# Patient Record
Sex: MALE | Race: WHITE | NOT HISPANIC OR LATINO | Employment: OTHER | URBAN - METROPOLITAN AREA
[De-identification: names, ages, dates, MRNs, and addresses within clinical notes are randomized per-mention and may not be internally consistent; named-entity substitution may affect disease eponyms.]

---

## 2023-05-31 ENCOUNTER — OFFICE VISIT (OUTPATIENT)
Dept: PRIMARY CARE | Facility: CLINIC | Age: 78
End: 2023-05-31
Payer: MEDICARE

## 2023-05-31 DIAGNOSIS — Z71.9 HEALTH EDUCATION/COUNSELING: Primary | ICD-10-CM

## 2023-05-31 PROCEDURE — UHSMG PR UH SELECT MEET AND GREET: Performed by: FAMILY MEDICINE

## 2023-09-22 PROBLEM — I10 HYPERTENSION: Status: ACTIVE | Noted: 2023-09-22

## 2023-09-22 PROBLEM — E03.9 HYPOTHYROID: Status: ACTIVE | Noted: 2023-09-22

## 2023-09-22 PROBLEM — Z95.1 S/P CABG (CORONARY ARTERY BYPASS GRAFT): Status: ACTIVE | Noted: 2023-08-08

## 2023-09-22 PROBLEM — R35.0 URINE FREQUENCY: Status: ACTIVE | Noted: 2023-09-22

## 2023-09-22 PROBLEM — M25.569 KNEE PAIN: Status: ACTIVE | Noted: 2023-09-22

## 2023-09-22 PROBLEM — I25.10 CAD (CORONARY ARTERY DISEASE): Status: ACTIVE | Noted: 2023-09-22

## 2023-09-22 PROBLEM — E78.5 HYPERLIPIDEMIA: Status: ACTIVE | Noted: 2023-09-22

## 2023-09-22 PROBLEM — L71.9 ROSACEA: Status: ACTIVE | Noted: 2023-09-22

## 2023-09-22 PROBLEM — I25.2 PAST MYOCARDIAL INFARCTION: Status: ACTIVE | Noted: 2023-08-08

## 2023-09-22 PROBLEM — G47.33 OBSTRUCTIVE SLEEP APNEA: Status: ACTIVE | Noted: 2023-09-22

## 2023-09-22 PROBLEM — M85.80 OSTEOPENIA: Status: ACTIVE | Noted: 2023-09-22

## 2023-09-22 PROBLEM — E78.1 HIGH TRIGLYCERIDES: Status: ACTIVE | Noted: 2023-09-22

## 2023-09-22 PROBLEM — H35.30 MACULAR DEGENERATION: Status: ACTIVE | Noted: 2022-11-28

## 2023-09-22 RX ORDER — LEVOTHYROXINE SODIUM 75 UG/1
75 TABLET ORAL
COMMUNITY
Start: 2014-03-31 | End: 2024-02-06 | Stop reason: SDUPTHER

## 2023-09-22 RX ORDER — ATENOLOL 25 MG/1
12.5 TABLET ORAL DAILY
COMMUNITY
Start: 2014-03-31

## 2023-09-22 RX ORDER — DICLOFENAC SODIUM 75 MG/1
75 TABLET, DELAYED RELEASE ORAL 2 TIMES DAILY
COMMUNITY
Start: 2013-09-26 | End: 2024-02-06 | Stop reason: SDUPTHER

## 2023-09-22 RX ORDER — NITROGLYCERIN 0.4 MG/1
0.4 TABLET SUBLINGUAL EVERY 5 MIN PRN
COMMUNITY
Start: 2013-03-28

## 2023-09-22 RX ORDER — ACETAMINOPHEN 500 MG
1 TABLET ORAL DAILY
COMMUNITY
Start: 2020-01-31

## 2023-09-22 RX ORDER — DOCUSATE SODIUM 100 MG/1
1 CAPSULE, LIQUID FILLED ORAL DAILY
COMMUNITY
Start: 2014-03-31

## 2023-11-07 ENCOUNTER — TELEPHONE (OUTPATIENT)
Dept: ALLERGY | Facility: CLINIC | Age: 78
End: 2023-11-07
Payer: MEDICARE

## 2023-11-13 ENCOUNTER — OFFICE VISIT (OUTPATIENT)
Dept: ALLERGY | Facility: CLINIC | Age: 78
End: 2023-11-13
Payer: MEDICARE

## 2023-11-13 VITALS
BODY MASS INDEX: 30.78 KG/M2 | SYSTOLIC BLOOD PRESSURE: 120 MMHG | WEIGHT: 215 LBS | DIASTOLIC BLOOD PRESSURE: 76 MMHG | HEART RATE: 51 BPM | HEIGHT: 70 IN

## 2023-11-13 DIAGNOSIS — I25.118 CORONARY ARTERY DISEASE INVOLVING NATIVE HEART WITH OTHER FORM OF ANGINA PECTORIS, UNSPECIFIED VESSEL OR LESION TYPE (CMS-HCC): ICD-10-CM

## 2023-11-13 DIAGNOSIS — G47.33 OBSTRUCTIVE SLEEP APNEA: Primary | ICD-10-CM

## 2023-11-13 DIAGNOSIS — I10 PRIMARY HYPERTENSION: ICD-10-CM

## 2023-11-13 PROCEDURE — 99214 OFFICE O/P EST MOD 30 MIN: CPT | Performed by: ALLERGY & IMMUNOLOGY

## 2023-11-13 PROCEDURE — 3078F DIAST BP <80 MM HG: CPT | Performed by: ALLERGY & IMMUNOLOGY

## 2023-11-13 PROCEDURE — 1126F AMNT PAIN NOTED NONE PRSNT: CPT | Performed by: ALLERGY & IMMUNOLOGY

## 2023-11-13 PROCEDURE — 3074F SYST BP LT 130 MM HG: CPT | Performed by: ALLERGY & IMMUNOLOGY

## 2023-11-13 PROCEDURE — 1159F MED LIST DOCD IN RCRD: CPT | Performed by: ALLERGY & IMMUNOLOGY

## 2023-11-13 ASSESSMENT — PAIN SCALES - GENERAL: PAINLEVEL: 0-NO PAIN

## 2023-11-13 NOTE — PROGRESS NOTES
Subjective   Patient ID:   [id number]   Baron Harrell is a 78 y.o. male who presents for Sleep Apnea (1 year fuv).    Chief Complaint   Patient presents with    Sleep Apnea     1 year fuv          HPI  This patient is here to evaluate for:    He is doing well and no concerns.   We discussed surgical options and the benefits/risks and at this time     i interrogated the machine:  APAP 12-20cm  FLEX 3  90% pressure: 13.7  mask fit: 100%  AHI 1.7 (same as last year)  DME: Medic    He has had the replacement machine approx Jan '23    No excessive daytime sleepiness    This patient denies any sleepiness while driving or any accidents related to sleepiness. There is no report of nodding at stop lights/signs, crossing center lines, or motor vehicle accidents due to sleepiness.    SH: [facility]    His other daughter, who has learning differences, works at  in the Cafeteria/nutrition dept.  Sofiya his wife is dealing with anemia and fatigue.      Review of Systems   All other systems reviewed and are negative.    Reports new issue:  ?allergic reaction - sensation of heat after eating.  Then diarrhea  Strawberry allergy in childhood but outgrown    Had a 2 lb lobster and got the sensation of warmth. In June '23.  Lasted quickly.   Told his son, who called his wife, so insisted he go to [place] ED.  He was put through cardiac testing    But since then, he's had shrimp without problems.     It happened a number of times in years past but never before was it related to lobster. It was always unrelated to any foods.   Not related to nsaids  He takes daily diclofenac for 20 years without known side effects.   Etoh - he drinks and this could be related.   We decided that it if occurs again, we'll do skin testing.    Objective     There were no vitals taken for this visit.     Physical Exam  Vitals and nursing note reviewed.   Constitutional:       Appearance: Normal appearance. He is normal weight.    HENT:      Head: Normocephalic and atraumatic.      Right Ear: External ear normal.      Left Ear: External ear normal.      Nose: No septal deviation, congestion or rhinorrhea.      Right Turbinates: Not enlarged, swollen or pale.      Left Turbinates: Not enlarged, swollen or pale.      Mouth/Throat:      Mouth: Mucous membranes are moist.   Eyes:      Extraocular Movements: Extraocular movements intact.      Conjunctiva/sclera: Conjunctivae normal.      Pupils: Pupils are equal, round, and reactive to light.   Cardiovascular:      Rate and Rhythm: Normal rate and regular rhythm.      Pulses: Normal pulses.      Heart sounds: Normal heart sounds.   Pulmonary:      Effort: Pulmonary effort is normal.      Breath sounds: Normal breath sounds. No wheezing, rhonchi or rales.   Musculoskeletal:         General: Normal range of motion.   Skin:     General: Skin is warm and dry.      Findings: No erythema or rash.   Neurological:      General: No focal deficit present.      Mental Status: He is alert and oriented to person, place, and time.   Psychiatric:         Mood and Affect: Mood normal.         Behavior: Behavior normal.            Current Outpatient Medications   Medication Sig Dispense Refill    aspirin-calcium carbonate 81 mg-300 mg calcium(777 mg) tablet Take 2 tablets by mouth once daily.      atenolol (Tenormin) 25 mg tablet Take 0.5 tablets (12.5 mg) by mouth once daily.      cholecalciferol (Vitamin D-3) 5,000 Units tablet Take 1 tablet (5,000 Units) by mouth once daily.      diclofenac (Voltaren) 75 mg EC tablet Take 1 tablet (75 mg) by mouth 2 times a day.      docusate sodium (Colace) 100 mg capsule Take 1 capsule (100 mg) by mouth once daily.      nitroglycerin (Nitrostat) 0.4 mg SL tablet Place 1 tablet (0.4 mg) under the tongue every 5 minutes if needed.      Synthroid 75 mcg tablet Take 1 tablet (75 mcg) by mouth once daily in the morning. Take before meals.       No current facility-administered  medications for this visit.       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 This patient is benefiting from using PAP and it is medically necessary. Recommend continuing the current plan and machine settings.    Obstructive sleep apnea (MOHIT) is a risk factor for impairing one's ability to function on daily activities such as driving, focus and mental well-being.  And MOHIT is a risk factor and can lead to chronic health conditions and cardiovascular complications such as heart disease, high blood pressure, diabetes, heart attacks and stroke. CPAP has been shown to treat and prevent these conditions by treating the obstructive sleep apnea. This patient has HTN and CAD  and we are addressing its treatment with CPAP. The MOHIT is WELL CONTROLLED and management with CPAP is reducing these risks.    See review of systems.      Emanuel Sullivan MD

## 2023-12-01 DIAGNOSIS — Z00.00 HEALTHCARE MAINTENANCE: ICD-10-CM

## 2024-01-08 ENCOUNTER — LAB (OUTPATIENT)
Dept: LAB | Facility: LAB | Age: 79
End: 2024-01-08
Payer: MEDICARE

## 2024-01-08 DIAGNOSIS — Z00.00 HEALTHCARE MAINTENANCE: ICD-10-CM

## 2024-01-08 DIAGNOSIS — I10 ESSENTIAL (PRIMARY) HYPERTENSION: Primary | ICD-10-CM

## 2024-01-08 LAB
ALBUMIN SERPL BCP-MCNC: 4.4 G/DL (ref 3.4–5)
ALP SERPL-CCNC: 71 U/L (ref 33–136)
ALT SERPL W P-5'-P-CCNC: 38 U/L (ref 10–52)
ANION GAP SERPL CALC-SCNC: 14 MMOL/L (ref 10–20)
AST SERPL W P-5'-P-CCNC: 31 U/L (ref 9–39)
BASOPHILS # BLD AUTO: 0.02 X10*3/UL (ref 0–0.1)
BASOPHILS NFR BLD AUTO: 0.3 %
BILIRUB SERPL-MCNC: 0.7 MG/DL (ref 0–1.2)
BUN SERPL-MCNC: 25 MG/DL (ref 6–23)
CALCIUM SERPL-MCNC: 9.1 MG/DL (ref 8.6–10.6)
CHLORIDE SERPL-SCNC: 106 MMOL/L (ref 98–107)
CHOLEST SERPL-MCNC: 102 MG/DL (ref 0–199)
CHOLESTEROL/HDL RATIO: 3.8
CO2 SERPL-SCNC: 25 MMOL/L (ref 21–32)
CREAT SERPL-MCNC: 0.92 MG/DL (ref 0.5–1.3)
CRP SERPL HS-MCNC: 0.8 MG/L
EGFRCR SERPLBLD CKD-EPI 2021: 86 ML/MIN/1.73M*2
EOSINOPHIL # BLD AUTO: 0.29 X10*3/UL (ref 0–0.4)
EOSINOPHIL NFR BLD AUTO: 5.1 %
ERYTHROCYTE [DISTWIDTH] IN BLOOD BY AUTOMATED COUNT: 13.5 % (ref 11.5–14.5)
EST. AVERAGE GLUCOSE BLD GHB EST-MCNC: 114 MG/DL
GLUCOSE SERPL-MCNC: 107 MG/DL (ref 74–99)
HBA1C MFR BLD: 5.6 %
HCT VFR BLD AUTO: 40.3 % (ref 41–52)
HDLC SERPL-MCNC: 26.8 MG/DL
HGB BLD-MCNC: 13.1 G/DL (ref 13.5–17.5)
IMM GRANULOCYTES # BLD AUTO: 0.01 X10*3/UL (ref 0–0.5)
IMM GRANULOCYTES NFR BLD AUTO: 0.2 % (ref 0–0.9)
LDLC SERPL CALC-MCNC: 20 MG/DL
LYMPHOCYTES # BLD AUTO: 1.14 X10*3/UL (ref 0.8–3)
LYMPHOCYTES NFR BLD AUTO: 19.9 %
MCH RBC QN AUTO: 30.3 PG (ref 26–34)
MCHC RBC AUTO-ENTMCNC: 32.5 G/DL (ref 32–36)
MCV RBC AUTO: 93 FL (ref 80–100)
MONOCYTES # BLD AUTO: 0.54 X10*3/UL (ref 0.05–0.8)
MONOCYTES NFR BLD AUTO: 9.4 %
NEUTROPHILS # BLD AUTO: 3.74 X10*3/UL (ref 1.6–5.5)
NEUTROPHILS NFR BLD AUTO: 65.1 %
NON HDL CHOLESTEROL: 75 MG/DL (ref 0–149)
NRBC BLD-RTO: 0 /100 WBCS (ref 0–0)
PLATELET # BLD AUTO: 135 X10*3/UL (ref 150–450)
POTASSIUM SERPL-SCNC: 4.2 MMOL/L (ref 3.5–5.3)
PROT SERPL-MCNC: 6.9 G/DL (ref 6.4–8.2)
PSA SERPL-MCNC: 2.11 NG/ML
RBC # BLD AUTO: 4.32 X10*6/UL (ref 4.5–5.9)
SODIUM SERPL-SCNC: 141 MMOL/L (ref 136–145)
TRIGL SERPL-MCNC: 276 MG/DL (ref 0–149)
VLDL: 55 MG/DL (ref 0–40)
WBC # BLD AUTO: 5.7 X10*3/UL (ref 4.4–11.3)

## 2024-01-08 PROCEDURE — 36415 COLL VENOUS BLD VENIPUNCTURE: CPT

## 2024-01-08 PROCEDURE — 82652 VIT D 1 25-DIHYDROXY: CPT

## 2024-01-08 PROCEDURE — 80061 LIPID PANEL: CPT

## 2024-01-08 PROCEDURE — 84153 ASSAY OF PSA TOTAL: CPT

## 2024-01-08 PROCEDURE — 86141 C-REACTIVE PROTEIN HS: CPT

## 2024-01-08 PROCEDURE — 85025 COMPLETE CBC W/AUTO DIFF WBC: CPT

## 2024-01-08 PROCEDURE — 80053 COMPREHEN METABOLIC PANEL: CPT

## 2024-01-08 PROCEDURE — 83036 HEMOGLOBIN GLYCOSYLATED A1C: CPT

## 2024-01-09 LAB — 1,25(OH)2D3 SERPL-MCNC: 49 PG/ML (ref 19.9–79.3)

## 2024-01-14 ENCOUNTER — TELEPHONE (OUTPATIENT)
Dept: PRIMARY CARE | Facility: CLINIC | Age: 79
End: 2024-01-14
Payer: MEDICARE

## 2024-02-02 ENCOUNTER — LAB (OUTPATIENT)
Dept: LAB | Facility: LAB | Age: 79
End: 2024-02-02
Payer: MEDICARE

## 2024-02-02 DIAGNOSIS — Z00.00 HEALTHCARE MAINTENANCE: ICD-10-CM

## 2024-02-02 LAB
APPEARANCE UR: ABNORMAL
BILIRUB UR STRIP.AUTO-MCNC: NEGATIVE MG/DL
COLOR UR: YELLOW
GLUCOSE UR STRIP.AUTO-MCNC: NEGATIVE MG/DL
KETONES UR STRIP.AUTO-MCNC: NEGATIVE MG/DL
LEUKOCYTE ESTERASE UR QL STRIP.AUTO: NEGATIVE
NITRITE UR QL STRIP.AUTO: NEGATIVE
PH UR STRIP.AUTO: 5 [PH]
PROT UR STRIP.AUTO-MCNC: NEGATIVE MG/DL
RBC # UR STRIP.AUTO: NEGATIVE /UL
SP GR UR STRIP.AUTO: 1.02
UROBILINOGEN UR STRIP.AUTO-MCNC: <2 MG/DL

## 2024-02-02 PROCEDURE — 81003 URINALYSIS AUTO W/O SCOPE: CPT

## 2024-02-06 ENCOUNTER — LAB (OUTPATIENT)
Dept: LAB | Facility: LAB | Age: 79
End: 2024-02-06
Payer: MEDICARE

## 2024-02-06 ENCOUNTER — OFFICE VISIT (OUTPATIENT)
Dept: PRIMARY CARE | Facility: CLINIC | Age: 79
End: 2024-02-06
Payer: MEDICARE

## 2024-02-06 VITALS
HEART RATE: 53 BPM | BODY MASS INDEX: 31.64 KG/M2 | DIASTOLIC BLOOD PRESSURE: 82 MMHG | TEMPERATURE: 98.3 F | HEIGHT: 69 IN | OXYGEN SATURATION: 98 % | SYSTOLIC BLOOD PRESSURE: 130 MMHG | WEIGHT: 213.6 LBS

## 2024-02-06 DIAGNOSIS — E03.9 ADULT HYPOTHYROIDISM: ICD-10-CM

## 2024-02-06 DIAGNOSIS — Z00.01 ENCOUNTER FOR GENERAL ADULT MEDICAL EXAMINATION WITH ABNORMAL FINDINGS: Primary | ICD-10-CM

## 2024-02-06 DIAGNOSIS — D64.9 ANEMIA, UNSPECIFIED TYPE: ICD-10-CM

## 2024-02-06 DIAGNOSIS — G62.9 NEUROPATHY: ICD-10-CM

## 2024-02-06 DIAGNOSIS — E78.5 DYSLIPIDEMIA: ICD-10-CM

## 2024-02-06 DIAGNOSIS — I25.118 CORONARY ARTERY DISEASE INVOLVING NATIVE HEART WITH OTHER FORM OF ANGINA PECTORIS, UNSPECIFIED VESSEL OR LESION TYPE (CMS-HCC): ICD-10-CM

## 2024-02-06 DIAGNOSIS — I10 HYPERTENSION, UNSPECIFIED TYPE: ICD-10-CM

## 2024-02-06 DIAGNOSIS — G89.29 CHRONIC BACK PAIN, UNSPECIFIED BACK LOCATION, UNSPECIFIED BACK PAIN LATERALITY: ICD-10-CM

## 2024-02-06 DIAGNOSIS — M54.9 CHRONIC BACK PAIN, UNSPECIFIED BACK LOCATION, UNSPECIFIED BACK PAIN LATERALITY: ICD-10-CM

## 2024-02-06 LAB
IRON SATN MFR SERPL: 20 % (ref 25–45)
IRON SERPL-MCNC: 83 UG/DL (ref 35–150)
TIBC SERPL-MCNC: 412 UG/DL (ref 240–445)
UIBC SERPL-MCNC: 329 UG/DL (ref 110–370)

## 2024-02-06 PROCEDURE — 3075F SYST BP GE 130 - 139MM HG: CPT | Performed by: FAMILY MEDICINE

## 2024-02-06 PROCEDURE — 3079F DIAST BP 80-89 MM HG: CPT | Performed by: FAMILY MEDICINE

## 2024-02-06 PROCEDURE — 36415 COLL VENOUS BLD VENIPUNCTURE: CPT

## 2024-02-06 PROCEDURE — 83550 IRON BINDING TEST: CPT

## 2024-02-06 PROCEDURE — 1159F MED LIST DOCD IN RCRD: CPT | Performed by: FAMILY MEDICINE

## 2024-02-06 PROCEDURE — UHSPHYS PR UH SELECT PHYSICAL: Performed by: FAMILY MEDICINE

## 2024-02-06 PROCEDURE — 84207 ASSAY OF VITAMIN B-6: CPT

## 2024-02-06 PROCEDURE — 1036F TOBACCO NON-USER: CPT | Performed by: FAMILY MEDICINE

## 2024-02-06 PROCEDURE — 83540 ASSAY OF IRON: CPT

## 2024-02-06 PROCEDURE — 1126F AMNT PAIN NOTED NONE PRSNT: CPT | Performed by: FAMILY MEDICINE

## 2024-02-06 RX ORDER — ROSUVASTATIN CALCIUM 40 MG/1
40 TABLET, COATED ORAL DAILY
COMMUNITY
Start: 2020-01-31 | End: 2024-02-06 | Stop reason: SDUPTHER

## 2024-02-06 RX ORDER — LEVOTHYROXINE SODIUM 200 UG/1
200 TABLET ORAL DAILY
COMMUNITY
Start: 2023-10-27 | End: 2024-02-06 | Stop reason: SDUPTHER

## 2024-02-06 RX ORDER — RAMIPRIL 5 MG/1
5 CAPSULE ORAL DAILY
Qty: 90 CAPSULE | Refills: 3 | Status: SHIPPED | OUTPATIENT
Start: 2024-02-06

## 2024-02-06 RX ORDER — LEVOTHYROXINE SODIUM 200 UG/1
200 TABLET ORAL
Qty: 90 TABLET | Refills: 3 | Status: SHIPPED | OUTPATIENT
Start: 2024-02-06

## 2024-02-06 RX ORDER — METRONIDAZOLE 7.5 MG/G
CREAM TOPICAL DAILY
COMMUNITY
Start: 2023-12-19

## 2024-02-06 RX ORDER — DICLOFENAC SODIUM 75 MG/1
75 TABLET, DELAYED RELEASE ORAL 2 TIMES DAILY
Qty: 180 TABLET | Refills: 3 | Status: SHIPPED | OUTPATIENT
Start: 2024-02-06

## 2024-02-06 RX ORDER — CALCIUM CARBONATE 300MG(750)
400 TABLET,CHEWABLE ORAL DAILY
COMMUNITY

## 2024-02-06 RX ORDER — RAMIPRIL 5 MG/1
CAPSULE ORAL
COMMUNITY
Start: 2014-03-31 | End: 2024-02-06 | Stop reason: SDUPTHER

## 2024-02-06 RX ORDER — ROSUVASTATIN CALCIUM 40 MG/1
40 TABLET, COATED ORAL DAILY
Qty: 90 TABLET | Refills: 3 | Status: SHIPPED | OUTPATIENT
Start: 2024-02-06

## 2024-02-06 RX ORDER — LEVOTHYROXINE SODIUM 75 UG/1
75 TABLET ORAL
Qty: 90 TABLET | Refills: 3 | Status: SHIPPED | OUTPATIENT
Start: 2024-02-06

## 2024-02-06 RX ORDER — SODIUM FLUORIDE1.1%, POTASSIUM NITRATE 5% 5.8; 57.5 MG/ML; MG/ML
GEL, DENTIFRICE DENTAL
COMMUNITY
Start: 2013-09-05

## 2024-02-06 RX ORDER — FLUOCINOLONE ACETONIDE 0.1 MG/ML
SOLUTION TOPICAL
COMMUNITY
Start: 2023-12-19 | End: 2024-05-21 | Stop reason: WASHOUT

## 2024-02-06 ASSESSMENT — LIFESTYLE VARIABLES
SKIP TO QUESTIONS 9-10: 1
HOW OFTEN DO YOU HAVE A DRINK CONTAINING ALCOHOL: 2-4 TIMES A MONTH
HOW OFTEN DO YOU HAVE SIX OR MORE DRINKS ON ONE OCCASION: NEVER
HOW MANY STANDARD DRINKS CONTAINING ALCOHOL DO YOU HAVE ON A TYPICAL DAY: 1 OR 2
AUDIT-C TOTAL SCORE: 2

## 2024-02-06 ASSESSMENT — PATIENT HEALTH QUESTIONNAIRE - PHQ9
SUM OF ALL RESPONSES TO PHQ9 QUESTIONS 1 & 2: 0
2. FEELING DOWN, DEPRESSED OR HOPELESS: NOT AT ALL
1. LITTLE INTEREST OR PLEASURE IN DOING THINGS: NOT AT ALL

## 2024-02-06 ASSESSMENT — ENCOUNTER SYMPTOMS
LOSS OF SENSATION IN FEET: 1
DEPRESSION: 0
OCCASIONAL FEELINGS OF UNSTEADINESS: 0

## 2024-02-06 ASSESSMENT — PAIN SCALES - GENERAL: PAINLEVEL: 0-NO PAIN

## 2024-02-06 NOTE — PROGRESS NOTES
"Subjective   Patient ID: Baron Harrell is a 78 y.o. male who presents for Annual Exam ([facility]).  HPI  1) Here to follow-up hypertension/lipids/CAD -denies chest pain, shortness of breath, dizziness, lightheadedness, hand or foot swelling that is new or different.  Taking and tolerating medications well.  Doing well with physical activity -does work with a  3 times a week on balance and variety of exercises    2) history of hypothyroidism, recent testing within target  No hyper or hypothyroid symptoms noted    3) history of scoliosis and some chronic back pain  Has been taking diclofenac twice daily for a long time and tolerating well  No dark stools or blood in stools  No epigastric discomfort  Finds this allows him to have a more active day  Recent kidney function tests quite good    4) history of some neuropathy in his feet, uncertain etiology  He suspects it may be related to his back  However he does take some supplements, question of this may be too much B6 in his diet    5) he used to relish donating blood, however became somewhat anemic, did not have significant workup but was told by PCP in the past to just stop donating   No significant bruising or bleeding  Red blood cell indices do not suggest iron deficiency per se, but given low hemoglobin we will check iron levels    Review of Systems  Essentially noncontributory otherwise    Objective   /82 (BP Location: Left arm, Patient Position: Sitting, BP Cuff Size: Large adult)   Pulse 53   Temp 36.8 °C (98.3 °F) (Temporal)   Ht 1.765 m (5' 9.49\")   Wt 96.9 kg (213 lb 9.6 oz)   SpO2 98%   BMI 31.10 kg/m²     Physical Exam  Vitals and nursing note reviewed.   Constitutional:       General: He is not in acute distress.     Appearance: He is not ill-appearing.   HENT:      Head: Normocephalic and atraumatic.      Right Ear: Tympanic membrane normal.      Left Ear: Tympanic membrane normal.      Mouth/Throat:      Pharynx: No posterior " oropharyngeal erythema.   Eyes:      General: No scleral icterus.     Extraocular Movements: Extraocular movements intact.      Pupils: Pupils are equal, round, and reactive to light.   Cardiovascular:      Rate and Rhythm: Regular rhythm. Bradycardia present.      Heart sounds: No murmur heard.  Pulmonary:      Breath sounds: Normal breath sounds. No wheezing or rhonchi.   Abdominal:      General: There is no distension.      Palpations: Abdomen is soft.      Tenderness: There is no abdominal tenderness.   Musculoskeletal:         General: Normal range of motion.      Right lower leg: No edema.      Left lower leg: No edema.   Lymphadenopathy:      Cervical: No cervical adenopathy.   Skin:     General: Skin is warm and dry.   Neurological:      Mental Status: He is alert and oriented to person, place, and time. Mental status is at baseline.      Motor: No weakness.      Coordination: Coordination normal.      Deep Tendon Reflexes: Reflexes normal.   Psychiatric:         Mood and Affect: Mood normal.         Behavior: Behavior normal.         Thought Content: Thought content normal.         Judgment: Judgment normal.         Assessment/Plan   Problem List Items Addressed This Visit       CAD (coronary artery disease)    Relevant Medications    rosuvastatin (Crestor) 40 mg tablet    Hypertension    Relevant Medications    ramipril (Altace) 5 mg capsule     Other Visit Diagnoses       Encounter for general adult medical examination with abnormal findings    -  Primary    Neuropathy        Relevant Orders    Vitamin B6    Dyslipidemia        Adult hypothyroidism        Relevant Medications    levothyroxine (Synthroid) 200 mcg tablet    levothyroxine (Synthroid) 75 mcg tablet    Anemia, unspecified type        Relevant Orders    Iron and TIBC    Chronic back pain, unspecified back location, unspecified back pain laterality        Relevant Medications    diclofenac (Voltaren) 75 mg EC tablet        1) Cardiac risk -seems  somewhat optimized now - cpm    2) thyroid - cpm - await TSH    3/4) back issues - may be driving neuropathy - cont NSAID and check B6    5) anemia - ck iron     6) prevention   - enc Prevnar-20  - colon CA - screen consider 2029  - prostate CA - PSA good  - skin CA - does see derm ([place])

## 2024-02-10 LAB — PYRIDOXAL PHOS SERPL-SCNC: 337.7 NMOL/L (ref 20–125)

## 2024-02-18 ENCOUNTER — TELEPHONE (OUTPATIENT)
Dept: PRIMARY CARE | Facility: CLINIC | Age: 79
End: 2024-02-18
Payer: MEDICARE

## 2024-02-18 DIAGNOSIS — E61.1 IRON DEFICIENCY: ICD-10-CM

## 2024-02-18 DIAGNOSIS — G62.9 NEUROPATHY: Primary | ICD-10-CM

## 2024-02-18 DIAGNOSIS — D64.9 ANEMIA, UNSPECIFIED TYPE: ICD-10-CM

## 2024-02-18 NOTE — TELEPHONE ENCOUNTER
----- Message from Baron Harrell sent at 2/13/2024  5:11 PM EST -----  Regarding: my lab results from 2/6  Contact: [phone]  Tod: What is your take on my B6 and iron saturation. Thanks, Baron

## 2024-02-18 NOTE — TELEPHONE ENCOUNTER
Had called and d/w patient   Recommend avoid any supplements with B6 in them - also can send a list of foods with B6 in them    Recommend start an iron supplement and check levels in 4-6 weeks    Earl Draper     I just wanted to follow-up about our call the other day.    I hope you have been able to start the Iron Sulfate (FeSO4) 325 mg once daily.  We should check the iron tests and B6 level in 4-6 weeks - no fasting needed.  Here's a list of foods that are high in B6.  You don't have to avoid them, just limit them.      Here are some good food sources for B6 for your mom to focus on consuming…  1)      Milk  2)      Avocado  3)      Chickpeas  4)      Bananas  5)      Green Peas  6)      Carrots  7)      Spinach  8)      Sweet Potato  9)      Beef  10)   Chicken Liver  11)   Eggs  12)   [place]  13)   Tuna  14)   Ricotta    Best regards - Boone

## 2024-05-08 DIAGNOSIS — L60.0 INGROWING NAIL: Primary | ICD-10-CM

## 2024-05-08 DIAGNOSIS — I73.9 PERIPHERAL VASCULAR DISEASE (CMS-HCC): ICD-10-CM

## 2024-05-20 ENCOUNTER — HOSPITAL ENCOUNTER (OUTPATIENT)
Dept: VASCULAR MEDICINE | Facility: CLINIC | Age: 79
Discharge: HOME | End: 2024-05-20
Payer: MEDICARE

## 2024-05-20 DIAGNOSIS — L60.0 INGROWING NAIL: ICD-10-CM

## 2024-05-20 DIAGNOSIS — I73.9 PERIPHERAL VASCULAR DISEASE (CMS-HCC): ICD-10-CM

## 2024-05-20 PROCEDURE — 93922 UPR/L XTREMITY ART 2 LEVELS: CPT | Performed by: STUDENT IN AN ORGANIZED HEALTH CARE EDUCATION/TRAINING PROGRAM

## 2024-05-20 PROCEDURE — 93922 UPR/L XTREMITY ART 2 LEVELS: CPT

## 2024-05-21 ENCOUNTER — OFFICE VISIT (OUTPATIENT)
Dept: CARDIOLOGY | Facility: CLINIC | Age: 79
End: 2024-05-21
Payer: MEDICARE

## 2024-05-21 VITALS
HEIGHT: 70 IN | HEART RATE: 49 BPM | OXYGEN SATURATION: 96 % | BODY MASS INDEX: 30.95 KG/M2 | DIASTOLIC BLOOD PRESSURE: 77 MMHG | SYSTOLIC BLOOD PRESSURE: 119 MMHG | WEIGHT: 216.2 LBS

## 2024-05-21 DIAGNOSIS — L60.0 INGROWN NAIL: Primary | ICD-10-CM

## 2024-05-21 PROCEDURE — 1036F TOBACCO NON-USER: CPT | Performed by: HOSPITALIST

## 2024-05-21 PROCEDURE — 99203 OFFICE O/P NEW LOW 30 MIN: CPT | Performed by: HOSPITALIST

## 2024-05-21 PROCEDURE — 1126F AMNT PAIN NOTED NONE PRSNT: CPT | Performed by: HOSPITALIST

## 2024-05-21 PROCEDURE — 1159F MED LIST DOCD IN RCRD: CPT | Performed by: HOSPITALIST

## 2024-05-21 PROCEDURE — 3078F DIAST BP <80 MM HG: CPT | Performed by: HOSPITALIST

## 2024-05-21 PROCEDURE — 3074F SYST BP LT 130 MM HG: CPT | Performed by: HOSPITALIST

## 2024-05-21 PROCEDURE — 99213 OFFICE O/P EST LOW 20 MIN: CPT | Performed by: HOSPITALIST

## 2024-05-21 PROCEDURE — 1160F RVW MEDS BY RX/DR IN RCRD: CPT | Performed by: HOSPITALIST

## 2024-05-21 RX ORDER — ASPIRIN 81 MG/1
81 TABLET ORAL DAILY
COMMUNITY

## 2024-05-21 ASSESSMENT — PATIENT HEALTH QUESTIONNAIRE - PHQ9
2. FEELING DOWN, DEPRESSED OR HOPELESS: NOT AT ALL
1. LITTLE INTEREST OR PLEASURE IN DOING THINGS: NOT AT ALL
SUM OF ALL RESPONSES TO PHQ9 QUESTIONS 1 AND 2: 0

## 2024-05-21 ASSESSMENT — COLUMBIA-SUICIDE SEVERITY RATING SCALE - C-SSRS
2. HAVE YOU ACTUALLY HAD ANY THOUGHTS OF KILLING YOURSELF?: NO
6. HAVE YOU EVER DONE ANYTHING, STARTED TO DO ANYTHING, OR PREPARED TO DO ANYTHING TO END YOUR LIFE?: NO
1. IN THE PAST MONTH, HAVE YOU WISHED YOU WERE DEAD OR WISHED YOU COULD GO TO SLEEP AND NOT WAKE UP?: NO

## 2024-05-21 ASSESSMENT — PAIN SCALES - GENERAL: PAINLEVEL: 0-NO PAIN

## 2024-05-21 NOTE — PROGRESS NOTES
Report given to Akiko ARRIAGA   Subjective   Baron Harrell is a 79 y.o. male With PMH of CAD status post remote CABG, HLD, HTN, MOHIT, and hypothyroidism, was referred by Dr. Castle for evaluation of PAD.  Patient has ingrown nail in his left leg, Dr. Huerta is planning for procedure.  Patient had PVR testing yesterday which showed triphasic flow throughout with normal ABIs.  Blood pressure today is normal at 127 over 69 mmHg. Patient is on aspirin 81, atenolol 25, ramipril 5 mg, atorvastatin 40 mg.    PVR from 5/20/2024 with right WOLF of 1.25/1.29 and left WOLF of 1.28/1.19; triphasic flow throughout.      Review of Systems  ROS is negative other than in HPI.      Objective   Physical Exam  General: NAD  HEENT: IEOM, PERRL   Neck: No JVD or carotid bruit  Lungs: CTAB  Heart: RRR, normal S1 and S2, no loud murmurs  Abdomen: Soft, nontender, positive bowel sounds  Extremities: No edema  Neurologic: No FND  Psychiatric: Normal mood and affect    Assessment/Plan   1-ingrown nail:  -PVR with no evidence of PAD.  -Podiatry can proceed with procedure of the ingrown nail.  If any problem with postop wound healing, please refer back.    2-history of CABG:  -Asymptomatic with good level of function capacity.  -Continue aspirin beta-blocker and atorvastatin 40.  -Follow-up with cardiologist.      Edwin Bolton MD

## 2024-05-24 ENCOUNTER — TELEPHONE (OUTPATIENT)
Dept: PRIMARY CARE | Facility: CLINIC | Age: 79
End: 2024-05-24
Payer: MEDICARE

## 2024-05-24 NOTE — TELEPHONE ENCOUNTER
"Patient calling to report that he just tested positive for COVID.   He came down a \"mild cold\" about 2 days ago.   He is supposed to be going out of town and his wife suggested he test before leaving.   He questions if he may be a candidate for the antiviral to help take the edge off.  Please call to further discuss.   "

## 2024-08-23 PROBLEM — H50.21 HYPOTROPIA OF RIGHT EYE: Status: ACTIVE | Noted: 2023-12-04

## 2024-08-23 PROBLEM — H35.3130 BILATERAL DRY AGE-RELATED MACULAR DEGENERATION: Status: ACTIVE | Noted: 2023-12-04

## 2024-08-23 PROBLEM — Z96.1 PSEUDOPHAKIA OF BOTH EYES: Status: ACTIVE | Noted: 2023-12-04

## 2024-08-23 PROBLEM — H50.22 HYPOTROPIA OF LEFT EYE: Status: ACTIVE | Noted: 2023-12-04

## 2024-08-26 ENCOUNTER — OFFICE VISIT (OUTPATIENT)
Dept: CARDIOLOGY | Facility: CLINIC | Age: 79
End: 2024-08-26
Payer: MEDICARE

## 2024-08-26 VITALS
BODY MASS INDEX: 30.72 KG/M2 | SYSTOLIC BLOOD PRESSURE: 113 MMHG | OXYGEN SATURATION: 95 % | HEIGHT: 70 IN | HEART RATE: 52 BPM | WEIGHT: 214.56 LBS | DIASTOLIC BLOOD PRESSURE: 71 MMHG

## 2024-08-26 DIAGNOSIS — I25.10 CORONARY ARTERY DISEASE INVOLVING NATIVE HEART WITHOUT ANGINA PECTORIS, UNSPECIFIED VESSEL OR LESION TYPE: Primary | ICD-10-CM

## 2024-08-26 PROCEDURE — 99213 OFFICE O/P EST LOW 20 MIN: CPT | Performed by: INTERNAL MEDICINE

## 2024-08-26 PROCEDURE — 1125F AMNT PAIN NOTED PAIN PRSNT: CPT | Performed by: INTERNAL MEDICINE

## 2024-08-26 PROCEDURE — 3074F SYST BP LT 130 MM HG: CPT | Performed by: INTERNAL MEDICINE

## 2024-08-26 PROCEDURE — 1036F TOBACCO NON-USER: CPT | Performed by: INTERNAL MEDICINE

## 2024-08-26 PROCEDURE — 1159F MED LIST DOCD IN RCRD: CPT | Performed by: INTERNAL MEDICINE

## 2024-08-26 PROCEDURE — 3078F DIAST BP <80 MM HG: CPT | Performed by: INTERNAL MEDICINE

## 2024-08-26 RX ORDER — UBIDECARENONE 30 MG
30 CAPSULE ORAL DAILY
COMMUNITY

## 2024-08-26 RX ORDER — NITROGLYCERIN 0.4 MG/1
0.4 TABLET SUBLINGUAL EVERY 5 MIN PRN
Qty: 25 TABLET | Refills: 1 | Status: SHIPPED | OUTPATIENT
Start: 2024-08-26 | End: 2024-09-25

## 2024-08-26 RX ORDER — FERROUS SULFATE 325(65) MG
325 TABLET ORAL
COMMUNITY

## 2024-08-26 ASSESSMENT — PAIN SCALES - GENERAL: PAINLEVEL: 4

## 2024-08-26 ASSESSMENT — ENCOUNTER SYMPTOMS
HEMOPTYSIS: 0
FALLS: 0
MEMORY LOSS: 0
COUGH: 0
OCCASIONAL FEELINGS OF UNSTEADINESS: 0
DIARRHEA: 0
NAUSEA: 0
CHILLS: 0
BLOATING: 0
WHEEZING: 0
HEMATURIA: 0
VOMITING: 0
FEVER: 0
HEADACHES: 0
DYSURIA: 0
LOSS OF SENSATION IN FEET: 0
MYALGIAS: 0
DEPRESSION: 0
ABDOMINAL PAIN: 0
ALTERED MENTAL STATUS: 0
CONSTIPATION: 0

## 2024-08-26 ASSESSMENT — COLUMBIA-SUICIDE SEVERITY RATING SCALE - C-SSRS
6. HAVE YOU EVER DONE ANYTHING, STARTED TO DO ANYTHING, OR PREPARED TO DO ANYTHING TO END YOUR LIFE?: NO
1. IN THE PAST MONTH, HAVE YOU WISHED YOU WERE DEAD OR WISHED YOU COULD GO TO SLEEP AND NOT WAKE UP?: NO
2. HAVE YOU ACTUALLY HAD ANY THOUGHTS OF KILLING YOURSELF?: NO

## 2024-08-26 NOTE — PROGRESS NOTES
Chief Complaint   Patient presents with    Follow-up    Coronary Artery Disease         HPI  80 yo WM w/ h/o CAD s/p CABG  (LIMA->LAD, SVG->PDA), s/p MI , HTN, HLD, TG, hypothyroid, MOHIT (CPAP) now here for cardiology f/u. No chest pain. No dyspnea. No palps. No LH/dizziness/syncope. No edema. No claudication. No further bedtime cramps with Tonic water. No cough. +occ back pain.  He golfs with no cardiac symptoms.  ECG : SB (59),  IMI, QTc 431  ECG : SB (57), ?inflat MI, QTc 447  GONZALEZ : no ischemia, EF 55% -> 70%  GONZALEZ : no ischemia, EF 60% -> 70-75%  CTA chest 10/18: no PE, stable pulm nodules, sev cor calc, mild athero of Ao, no aneurysm  WOLF : normal  Carotid US : no HDSS     Review of Systems   Constitutional: Negative for chills, fever and malaise/fatigue.   HENT:  Negative for hearing loss.    Eyes:  Negative for visual disturbance.   Respiratory:  Negative for cough, hemoptysis and wheezing.    Skin:  Negative for rash.   Musculoskeletal:  Negative for falls and myalgias.   Gastrointestinal:  Negative for bloating, abdominal pain, constipation, diarrhea, dysphagia, nausea and vomiting.   Genitourinary:  Negative for dysuria and hematuria.   Neurological:  Negative for headaches.   Psychiatric/Behavioral:  Negative for altered mental status, depression and memory loss.       Social History     Tobacco Use    Smoking status: Former     Current packs/day: 0.00     Average packs/day: 0.3 packs/day for 5.0 years (1.3 ttl pk-yrs)     Types: Cigarettes     Start date: 1966     Quit date: 1971     Years since quittin.6     Passive exposure: Past    Smokeless tobacco: Never   Substance Use Topics    Alcohol use: Not Currently     Comment: social      Family History   Problem Relation Name Age of Onset    Coronary artery disease Sister        Allergies   Allergen Reactions    Diphenhydramine Hallucinations and Other     Hallucinations      Current Outpatient Medications    Medication Instructions    activated charcoal (HOMEOPATHIC PRODUCTS PO) oral, ULTIMATE OMEGA OIL 1280 MG X 2 DAY    aspirin 81 mg, oral, Daily    atenolol (TENORMIN) 12.5 mg, oral, Daily    cholecalciferol (Vitamin D-3) 5,000 Units tablet 1 tablet, oral, Daily    co-enzyme Q-10 30 mg, oral, Daily    diclofenac (VOLTAREN) 75 mg, oral, 2 times daily    docusate sodium (Colace) 100 mg capsule 1 capsule, oral, Daily    ferrous sulfate (325 mg ferrous sulfate) 325 mg, oral, Daily with breakfast    levothyroxine (SYNTHROID) 200 mcg, oral, Daily before breakfast, Take with 75 mcg dose    levothyroxine (SYNTHROID) 75 mcg, oral, Daily before breakfast, Take w/ 200 mcg dose    magnesium oxide (MAG-OX) 400 mg, oral, Daily    metroNIDAZOLE (Metrocream) 0.75 % cream Topical, Daily    mv-min/FA/vit K/lutein/zeaxant (PRESERVISION AREDS 2 PLUS MV ORAL) 2 tablets, oral, Daily    nitroglycerin (NITROSTAT) 0.4 mg, sublingual, Every 5 min PRN    ramipril (ALTACE) 5 mg, oral, Daily    rosuvastatin (CRESTOR) 40 mg, oral, Daily    sodium fluoride-pot nitrate 1.1-5 % paste USE AS DIRECTED      Vitals:    08/26/24 1341   BP: 113/71   Pulse: 52   SpO2: 95%      Physical Exam  Constitutional:       Appearance: Normal appearance.   HENT:      Head: Normocephalic and atraumatic.      Nose: Nose normal.   Neck:      Vascular: No carotid bruit.   Cardiovascular:      Rate and Rhythm: Normal rate and regular rhythm.      Heart sounds: No murmur heard.  Pulmonary:      Effort: Pulmonary effort is normal.      Breath sounds: Normal breath sounds.   Abdominal:      Palpations: Abdomen is soft.      Tenderness: There is no abdominal tenderness.   Musculoskeletal:      Right lower leg: Edema present.      Left lower leg: Edema present.      Comments: Tr LE edema   Skin:     General: Skin is warm and dry.   Neurological:      General: No focal deficit present.      Mental Status: He is alert.   Psychiatric:         Mood and Affect: Mood normal.          Judgment: Judgment normal.        Results/Data  1/24 Cr 0.92, K 4.2, LFT nl, LDL 20, HDL 27, , Chol 102, HGB 13.1, , hgba1c 5.6, CRP 0.8  9/21 Cr 0.86, K 4.7, Mg 2.37, LDL 46, HDL 29, , Chol 116, Lp(a) <8.4, Homo 10  7/21 Cr 0.91, K 4.7, LFT nl, TSH 0.09, FT4 1.65  9/20 Cr 0.81, K 4.5, LFT nl, LDL 40, HDL 27, , Chol 128, HGB 13.8, , TSH 0.28, FT4 1.63  7/20 LDL 25, HDL 28, , Chol 115  9/19 HDL 34, , Chol 88, TSH 3.79, FT4 1.31  6/19 Cr 0.95, K 4.8, LFT nl, LDL 42, HDL 28, , Chol 146, HGB 14.3,   10/18 LDL 43, HDL 31, , Chol 129  10/17 LDL 49    Assessment/Plan   80 yo WM w/ h/o CAD s/p CABG '91 (LIMA->LAD, SVG->PDA), s/p MI '91, HTN, HLD, TG, hypothyroid, MOHIT (CPAP). Doing well. No cardiac symptoms.   -continue ASA 81 qd (with food)  -continue Atenolol 12.5 qd  -continue Ramipril 5 qd  -continue Rosuva 40 qhs -> goal LDL <55  -continue Omega-3 (Vascepa was expensive); handout on lifestyle/diet ways to reduce TGs given to pt -> goal TG <150   -f/u 1 year (earlier if needed)     Leif Simons MD

## 2024-09-11 DIAGNOSIS — I10 PRIMARY HYPERTENSION: ICD-10-CM

## 2024-09-11 RX ORDER — ATENOLOL 25 MG/1
12.5 TABLET ORAL DAILY
Qty: 45 TABLET | Refills: 3 | Status: SHIPPED | OUTPATIENT
Start: 2024-09-11

## 2024-09-12 ENCOUNTER — TELEPHONE (OUTPATIENT)
Dept: PRIMARY CARE | Facility: CLINIC | Age: 79
End: 2024-09-12
Payer: MEDICARE

## 2024-09-12 NOTE — TELEPHONE ENCOUNTER
"Called patient - he has no recollection of having an adverse reaction to Propranolol nor Augmentin nor Bactrim (those all at one time were in his chart under \"allergies\" and since removed) - he doesn't recall ever being on propranolol and states \"I've been on atenolol for about 20 years\"    Please notify pharmacy okay to fill - Thanks  "

## 2024-09-12 NOTE — TELEPHONE ENCOUNTER
Notification received from the pharmacy that patient has reported an allergy to Propranolol.  Please review and evaluate if Atenolol would be appropriate to dispense.  Please advise.

## 2024-09-19 ENCOUNTER — OFFICE VISIT (OUTPATIENT)
Dept: ORTHOPEDIC SURGERY | Facility: CLINIC | Age: 79
End: 2024-09-19
Payer: MEDICARE

## 2024-09-19 ENCOUNTER — HOSPITAL ENCOUNTER (OUTPATIENT)
Dept: RADIOLOGY | Facility: CLINIC | Age: 79
Discharge: HOME | End: 2024-09-19
Payer: MEDICARE

## 2024-09-19 VITALS — HEIGHT: 70 IN | WEIGHT: 214 LBS | BODY MASS INDEX: 30.64 KG/M2

## 2024-09-19 DIAGNOSIS — M54.16 LUMBAR RADICULITIS: Primary | ICD-10-CM

## 2024-09-19 DIAGNOSIS — M54.16 LUMBAR RADICULITIS: ICD-10-CM

## 2024-09-19 PROCEDURE — 99213 OFFICE O/P EST LOW 20 MIN: CPT | Performed by: PHYSICIAN ASSISTANT

## 2024-09-19 PROCEDURE — 72110 X-RAY EXAM L-2 SPINE 4/>VWS: CPT

## 2024-09-19 PROCEDURE — 1159F MED LIST DOCD IN RCRD: CPT | Performed by: PHYSICIAN ASSISTANT

## 2024-09-19 PROCEDURE — 1160F RVW MEDS BY RX/DR IN RCRD: CPT | Performed by: PHYSICIAN ASSISTANT

## 2024-09-19 PROCEDURE — 1036F TOBACCO NON-USER: CPT | Performed by: PHYSICIAN ASSISTANT

## 2024-09-20 PROBLEM — M54.16 LUMBAR RADICULITIS: Status: ACTIVE | Noted: 2024-09-20

## 2024-09-20 NOTE — PROGRESS NOTES
Baron returns today for a follow up. LOV was in 2022.    He returns for similar concerns and sxs as before. His main concern is he has noticed that he continues to shrink but along with that he recently has developed new onset over the last 3m of R LBP with R posterior lateral hip pain. Thankfully, no LE radicular sxs. He ambulates without assistance.     He is very active for 79. He golfs weekly and works with a  on a routine basis. He is on cymbalta    No recent PT or Injections    I reviewed the complete 30-point review of systems that was documented on the scanned patient intake form.  All other systems are non-contributory except as defined in history of present illness.    Const: Well-appearing, well-nourished [male] in no distress.  Eyes: Normal appearing sclera and conjunctiva, no jaundice, pupils normal in appearance  Neck: No masses or lymphadenopathy appreciated  Resp: breathing comfortably, normal respiratory rate  CV: No upper or lower extremity edema.  Musculoskeletal: [Normal gait]. [Able to heel/toe walk without trouble].  [Cervical] ROM [supple].  Strength exam of upper and lower extremities reveals 5/5 strength in all major muscle groups [with the exception of].  [No intrinsic wasting.] [Negative] Spurling sign.  [Negative] straight leg raise [bilaterally].  Neuro: Sensation is intact and equal bilaterally. Deep tendon reflexes are [normal and symmetric].  [No clonus], [negative] Melton sign, [negative] Lhermitte's sign  Skin: Intact without any lesions, normal turgor  Psych: Alert and oriented x3, normal mood and affect    XR of his L spine were taken today and reviewed with him. They were also compared to previous images from 2019. These results are very similar, he has degenerative changes and endplate narrowing resulting in foraminal narrowing throughout the lumbar spine. Along with that he has curvature of the thoracic lumbar spine as well.     Plan:  -start PT  -Ref to PMR for  injection    Keep us updated on how he is feeling.     If not improvement we will order an MRI L spine     This note was dictated using speech recognition software and was not corrected for spelling or grammatical errors

## 2024-11-08 DIAGNOSIS — I25.118 CORONARY ARTERY DISEASE INVOLVING NATIVE HEART WITH OTHER FORM OF ANGINA PECTORIS, UNSPECIFIED VESSEL OR LESION TYPE: ICD-10-CM

## 2024-11-08 DIAGNOSIS — Z95.1 S/P CABG (CORONARY ARTERY BYPASS GRAFT): ICD-10-CM

## 2024-11-08 DIAGNOSIS — I10 PRIMARY HYPERTENSION: ICD-10-CM

## 2024-11-08 DIAGNOSIS — G47.33 OBSTRUCTIVE SLEEP APNEA: ICD-10-CM

## 2024-12-13 ENCOUNTER — CLINICAL SUPPORT (OUTPATIENT)
Dept: EMERGENCY MEDICINE | Facility: HOSPITAL | Age: 79
End: 2024-12-13
Payer: MEDICARE

## 2024-12-13 ENCOUNTER — APPOINTMENT (OUTPATIENT)
Dept: RADIOLOGY | Facility: HOSPITAL | Age: 79
End: 2024-12-13
Payer: MEDICARE

## 2024-12-13 ENCOUNTER — HOSPITAL ENCOUNTER (OUTPATIENT)
Facility: HOSPITAL | Age: 79
Setting detail: OBSERVATION
Discharge: HOME | End: 2024-12-14
Attending: EMERGENCY MEDICINE
Payer: MEDICARE

## 2024-12-13 DIAGNOSIS — G45.9 TIA (TRANSIENT ISCHEMIC ATTACK): Primary | ICD-10-CM

## 2024-12-13 DIAGNOSIS — R47.01 EXPRESSIVE APHASIA: ICD-10-CM

## 2024-12-13 LAB
ALBUMIN SERPL BCP-MCNC: 4.1 G/DL (ref 3.4–5)
ALP SERPL-CCNC: 66 U/L (ref 33–136)
ALT SERPL W P-5'-P-CCNC: 27 U/L (ref 10–52)
ANION GAP SERPL CALC-SCNC: 13 MMOL/L (ref 10–20)
APTT PPP: 32 SECONDS (ref 27–38)
AST SERPL W P-5'-P-CCNC: 23 U/L (ref 9–39)
BASOPHILS # BLD AUTO: 0.01 X10*3/UL (ref 0–0.1)
BASOPHILS NFR BLD AUTO: 0.2 %
BILIRUB SERPL-MCNC: 0.6 MG/DL (ref 0–1.2)
BUN SERPL-MCNC: 22 MG/DL (ref 6–23)
CALCIUM SERPL-MCNC: 8.6 MG/DL (ref 8.6–10.6)
CARDIAC TROPONIN I PNL SERPL HS: 3 NG/L (ref 0–53)
CHLORIDE SERPL-SCNC: 107 MMOL/L (ref 98–107)
CHOLEST SERPL-MCNC: 93 MG/DL (ref 0–199)
CHOLESTEROL/HDL RATIO: 4.1
CO2 SERPL-SCNC: 24 MMOL/L (ref 21–32)
CREAT SERPL-MCNC: 0.79 MG/DL (ref 0.5–1.3)
EGFRCR SERPLBLD CKD-EPI 2021: >90 ML/MIN/1.73M*2
EOSINOPHIL # BLD AUTO: 0.2 X10*3/UL (ref 0–0.4)
EOSINOPHIL NFR BLD AUTO: 4.8 %
ERYTHROCYTE [DISTWIDTH] IN BLOOD BY AUTOMATED COUNT: 13.2 % (ref 11.5–14.5)
EST. AVERAGE GLUCOSE BLD GHB EST-MCNC: 103 MG/DL
GLUCOSE BLD MANUAL STRIP-MCNC: 123 MG/DL (ref 74–99)
GLUCOSE BLD MANUAL STRIP-MCNC: 144 MG/DL (ref 74–99)
GLUCOSE SERPL-MCNC: 122 MG/DL (ref 74–99)
HBA1C MFR BLD: 5.2 %
HCT VFR BLD AUTO: 40.2 % (ref 41–52)
HDLC SERPL-MCNC: 22.9 MG/DL
HGB BLD-MCNC: 13.4 G/DL (ref 13.5–17.5)
IMM GRANULOCYTES # BLD AUTO: 0.01 X10*3/UL (ref 0–0.5)
IMM GRANULOCYTES NFR BLD AUTO: 0.2 % (ref 0–0.9)
INR PPP: 1.2 (ref 0.9–1.1)
LDLC SERPL CALC-MCNC: 24 MG/DL
LYMPHOCYTES # BLD AUTO: 0.86 X10*3/UL (ref 0.8–3)
LYMPHOCYTES NFR BLD AUTO: 20.5 %
MCH RBC QN AUTO: 30.7 PG (ref 26–34)
MCHC RBC AUTO-ENTMCNC: 33.3 G/DL (ref 32–36)
MCV RBC AUTO: 92 FL (ref 80–100)
MONOCYTES # BLD AUTO: 0.33 X10*3/UL (ref 0.05–0.8)
MONOCYTES NFR BLD AUTO: 7.9 %
NEUTROPHILS # BLD AUTO: 2.79 X10*3/UL (ref 1.6–5.5)
NEUTROPHILS NFR BLD AUTO: 66.4 %
NON HDL CHOLESTEROL: 70 MG/DL (ref 0–149)
NRBC BLD-RTO: 0 /100 WBCS (ref 0–0)
PLATELET # BLD AUTO: ABNORMAL 10*3/UL
POTASSIUM SERPL-SCNC: 4.2 MMOL/L (ref 3.5–5.3)
PROT SERPL-MCNC: 6.4 G/DL (ref 6.4–8.2)
PROTHROMBIN TIME: 13.4 SECONDS (ref 9.8–12.8)
RBC # BLD AUTO: 4.37 X10*6/UL (ref 4.5–5.9)
RBC MORPH BLD: NORMAL
SODIUM SERPL-SCNC: 140 MMOL/L (ref 136–145)
TRIGL SERPL-MCNC: 229 MG/DL (ref 0–149)
VLDL: 46 MG/DL (ref 0–40)
WBC # BLD AUTO: 4.2 X10*3/UL (ref 4.4–11.3)

## 2024-12-13 PROCEDURE — G0378 HOSPITAL OBSERVATION PER HR: HCPCS

## 2024-12-13 PROCEDURE — 99223 1ST HOSP IP/OBS HIGH 75: CPT

## 2024-12-13 PROCEDURE — 85610 PROTHROMBIN TIME: CPT | Performed by: EMERGENCY MEDICINE

## 2024-12-13 PROCEDURE — 36415 COLL VENOUS BLD VENIPUNCTURE: CPT | Performed by: EMERGENCY MEDICINE

## 2024-12-13 PROCEDURE — 70450 CT HEAD/BRAIN W/O DYE: CPT | Mod: 59

## 2024-12-13 PROCEDURE — 84484 ASSAY OF TROPONIN QUANT: CPT | Performed by: EMERGENCY MEDICINE

## 2024-12-13 PROCEDURE — 70496 CT ANGIOGRAPHY HEAD: CPT

## 2024-12-13 PROCEDURE — 70498 CT ANGIOGRAPHY NECK: CPT | Performed by: RADIOLOGY

## 2024-12-13 PROCEDURE — 85025 COMPLETE CBC W/AUTO DIFF WBC: CPT | Performed by: EMERGENCY MEDICINE

## 2024-12-13 PROCEDURE — 99222 1ST HOSP IP/OBS MODERATE 55: CPT

## 2024-12-13 PROCEDURE — 82947 ASSAY GLUCOSE BLOOD QUANT: CPT

## 2024-12-13 PROCEDURE — 99285 EMERGENCY DEPT VISIT HI MDM: CPT | Mod: 25 | Performed by: EMERGENCY MEDICINE

## 2024-12-13 PROCEDURE — 70551 MRI BRAIN STEM W/O DYE: CPT | Performed by: RADIOLOGY

## 2024-12-13 PROCEDURE — 80061 LIPID PANEL: CPT

## 2024-12-13 PROCEDURE — 2550000001 HC RX 255 CONTRASTS: Performed by: EMERGENCY MEDICINE

## 2024-12-13 PROCEDURE — 93005 ELECTROCARDIOGRAM TRACING: CPT

## 2024-12-13 PROCEDURE — 85730 THROMBOPLASTIN TIME PARTIAL: CPT | Performed by: EMERGENCY MEDICINE

## 2024-12-13 PROCEDURE — 70496 CT ANGIOGRAPHY HEAD: CPT | Performed by: RADIOLOGY

## 2024-12-13 PROCEDURE — 83036 HEMOGLOBIN GLYCOSYLATED A1C: CPT

## 2024-12-13 PROCEDURE — 70450 CT HEAD/BRAIN W/O DYE: CPT | Performed by: RADIOLOGY

## 2024-12-13 PROCEDURE — 80053 COMPREHEN METABOLIC PANEL: CPT | Performed by: EMERGENCY MEDICINE

## 2024-12-13 PROCEDURE — 82947 ASSAY GLUCOSE BLOOD QUANT: CPT | Mod: 59

## 2024-12-13 PROCEDURE — 70551 MRI BRAIN STEM W/O DYE: CPT

## 2024-12-13 RX ORDER — ACETAMINOPHEN 325 MG/1
650 TABLET ORAL EVERY 4 HOURS PRN
Status: DISCONTINUED | OUTPATIENT
Start: 2024-12-13 | End: 2024-12-14 | Stop reason: HOSPADM

## 2024-12-13 RX ORDER — LISINOPRIL 10 MG/1
10 TABLET ORAL DAILY
Status: DISCONTINUED | OUTPATIENT
Start: 2024-12-14 | End: 2024-12-14 | Stop reason: HOSPADM

## 2024-12-13 RX ORDER — FERROUS SULFATE 325(65) MG
65 TABLET ORAL
Status: DISCONTINUED | OUTPATIENT
Start: 2024-12-14 | End: 2024-12-14 | Stop reason: HOSPADM

## 2024-12-13 RX ORDER — LEVOTHYROXINE SODIUM 100 UG/1
200 TABLET ORAL
Status: DISCONTINUED | OUTPATIENT
Start: 2024-12-14 | End: 2024-12-14 | Stop reason: HOSPADM

## 2024-12-13 RX ORDER — ROSUVASTATIN CALCIUM 20 MG/1
40 TABLET, COATED ORAL DAILY
Status: DISCONTINUED | OUTPATIENT
Start: 2024-12-13 | End: 2024-12-14 | Stop reason: HOSPADM

## 2024-12-13 RX ORDER — ATENOLOL 25 MG/1
12.5 TABLET ORAL DAILY
Status: DISCONTINUED | OUTPATIENT
Start: 2024-12-14 | End: 2024-12-14 | Stop reason: HOSPADM

## 2024-12-13 RX ORDER — ASPIRIN 81 MG/1
81 TABLET ORAL DAILY
Status: DISCONTINUED | OUTPATIENT
Start: 2024-12-13 | End: 2024-12-14 | Stop reason: HOSPADM

## 2024-12-13 RX ORDER — LEVOTHYROXINE SODIUM 75 UG/1
75 TABLET ORAL
Status: DISCONTINUED | OUTPATIENT
Start: 2024-12-14 | End: 2024-12-14 | Stop reason: HOSPADM

## 2024-12-13 RX ADMIN — IOHEXOL 90 ML: 350 INJECTION, SOLUTION INTRAVENOUS at 20:59

## 2024-12-13 ASSESSMENT — LIFESTYLE VARIABLES
EVER FELT BAD OR GUILTY ABOUT YOUR DRINKING: NO
HAVE PEOPLE ANNOYED YOU BY CRITICIZING YOUR DRINKING: NO
EVER HAD A DRINK FIRST THING IN THE MORNING TO STEADY YOUR NERVES TO GET RID OF A HANGOVER: NO
TOTAL SCORE: 0
HAVE YOU EVER FELT YOU SHOULD CUT DOWN ON YOUR DRINKING: NO

## 2024-12-13 ASSESSMENT — PAIN SCALES - GENERAL: PAINLEVEL_OUTOF10: 0 - NO PAIN

## 2024-12-13 ASSESSMENT — PAIN - FUNCTIONAL ASSESSMENT: PAIN_FUNCTIONAL_ASSESSMENT: 0-10

## 2024-12-13 NOTE — ED PROVIDER NOTES
History of Present Illness     History provided by: Patient and Significant Other  Limitations to History: None  External Records Reviewed with Brief Summary: None    HPI:  Baron Harrell is a 79 y.o. male with medical history notable for MI at age 46, also has HLD.  He is coming in with some changes in his ability to speak.  He began having some word finding difficulties with symptoms most notable while trying to read, but also occurring while trying to have conversations.  He believes that symptoms started at approximately 10:30 AM today and was definitely occurring before noon today.   Has no other complaints, denies headache, weakness, dizziness, recent trauma, chest pain, shortness of breath, syncope/presyncope, abdominal pain, nausea, vomiting, changes in bowel movements, changes in urinary function, dysuria    Physical Exam   Triage vitals:  T 36.7 °C (98.1 °F)  HR 86  /68  RR 16  O2 98 % None (Room air)    Physical Exam  Constitutional:       General: He is not in acute distress.     Appearance: Normal appearance. He is not ill-appearing.   HENT:      Head: Normocephalic and atraumatic.      Nose: Nose normal.      Mouth/Throat:      Mouth: Mucous membranes are moist.      Pharynx: Oropharynx is clear.   Eyes:      General: No scleral icterus.     Extraocular Movements: Extraocular movements intact.      Pupils: Pupils are equal, round, and reactive to light.   Cardiovascular:      Rate and Rhythm: Normal rate and regular rhythm.      Pulses: Normal pulses.      Heart sounds: Normal heart sounds. No murmur heard.     No friction rub. No gallop.   Pulmonary:      Effort: Pulmonary effort is normal. No respiratory distress.      Breath sounds: Normal breath sounds. No stridor. No wheezing, rhonchi or rales.   Chest:      Chest wall: No tenderness.   Abdominal:      General: Abdomen is flat.      Palpations: Abdomen is soft.      Tenderness: There is no abdominal tenderness. There is no guarding or  rebound.   Musculoskeletal:         General: No tenderness. Normal range of motion.      Cervical back: Normal range of motion and neck supple. No rigidity or tenderness.      Comments: No pain or tenderness to the midline spine in the CT or L-spine's, no bony step-offs   Skin:     General: Skin is warm and dry.      Capillary Refill: Capillary refill takes less than 2 seconds.   Neurological:      Mental Status: He is alert and oriented to person, place, and time.      GCS: GCS eye subscore is 4. GCS verbal subscore is 5. GCS motor subscore is 6.      Cranial Nerves: Cranial nerves 2-12 are intact.      Sensory: No sensory deficit.      Motor: Motor function is intact. No weakness or tremor.      Coordination: Coordination is intact. Romberg sign negative. Finger-Nose-Finger Test and Heel to Shin Test normal.      Comments: NIH:  Alert and keenly responsive, correct month and age, able to blink eyes and squeeze hands, EOMI, visual fields are fully intact, face is symmetric through facial movements, no motor drift in the bilateral upper or lower extremities, appropriate finger-to-nose and heel-shin, does not appear ataxic, sensation is fully intact throughout.  There is occasional word finding and production difficulty but very rare.  Words are articulate, no signs of dysarthria.  No signs of extinction/inattention   Psychiatric:         Mood and Affect: Mood normal.         Behavior: Behavior normal.          Medical Decision Making & ED Course   Medical Decision Makin y.o. male with above history and physical notable for sudden onset of mild aphasia with difficulty with word finding and production initially had labs for BAT which was canceled as patient was outside of window.  Patient was worked up for stroke with relatively few findings, CTA and MRIs were negative.  Patient was reevaluated by neurostroke who felt that this was now more likely due to a seizure instead given beginning of symptom resolution  and having relatively few other symptoms with his aphasia which would not be expected in a stroke which would have been expected to cause more global issues such as weakness.  Patient will be admitted to CDU for neurology to see again tomorrow and to get EEG    ED Course:  Diagnoses as of 12/22/24 1348   TIA (transient ischemic attack)   Expressive aphasia       Scoring Tools Utilized:   NIH Stroke Scale: 0     ---     Social Determinants of Health which Significantly Impact Care: None identified     EKG Independent Interpretation:  EKG was obtained but is not available to me at time of writing this note    Independent Result Review and Interpretation: Relevant laboratory and radiographic results were reviewed and independently interpreted by myself.  As necessary, they are commented on in the ED Course.    Chronic conditions affecting the patient's care: As documented above in MDM    The patient was discussed with the following consultants/services:  Neurology for diagnostic assistance with concern for stroke and CDU ULISES for acceptance to CDU    Care Considerations: As documented above in MDM      Disposition   Patient was sent to CDU for further workup with EEG and neurology to see in the Morning    Procedures   Procedures    This was a shared visit with an ED attending.  The patient was seen and discussed with the ED attending    Joo Boyce MD  Emergency Medicine        Joo Boyce MD  Resident  12/13/24 2743    Emergency Medicine Attending Attestation:     Diagnoses as of 12/22/24 1348   TIA (transient ischemic attack)   Expressive aphasia       The patient was seen by the resident/fellow.  I have personally performed a substantive portion of the encounter.  I have seen and examined the patient; agree with the workup, evaluation, MDM, management and diagnosis.  The care plan has been discussed with the resident; I have reviewed the resident’s note and agree with the documented findings.           Patient evaluated for expressive aphasia and comprehension alteration (language primarily)  CT and MRI with no acute infarct or vascular disease   Neurology will follow in CDU and plan for EEG  Reviewed findings with family and patient    I independently interpreted patient's EKG and agree with the above mentioned interpretation.        MD Lillian Feliz MD  12/22/24 [id number]

## 2024-12-13 NOTE — CONSULTS
Subjective     HPI  Baron Harrell is a 79 y.o. Right-handed male with history of CAD s/p CABG  (LIMA->LAD, SVG->PDA), s/p MI , HTN, HLD, TG, hypothyroid, MOHIT (CPAP) , presenting with expressive aphasia, neurology consulted for aphasia. He was talking with his daughter on the phone when he started having word finding difficulties at 12:30 PM today. He also was having difficulty talking with contractors that were working on his daughters house. The aphasia began suddenly and has not resolved. He denied any bright flashing lights, or abnormal movements. He has not been sleeping well. His denied excessive stress in his life but did not that his son had a major injury recently.    Past Medical History  No past medical history on file.  Surgical History  No past surgical history on file.  Social History  Social History     Tobacco Use    Smoking status: Former     Current packs/day: 0.00     Average packs/day: 0.3 packs/day for 5.0 years (1.3 ttl pk-yrs)     Types: Cigarettes     Start date: 1966     Quit date: 1971     Years since quittin.9     Passive exposure: Past    Smokeless tobacco: Never   Substance Use Topics    Alcohol use: Not Currently     Comment: social    Drug use: Not Currently     Allergies  Diphenhydramine    Home Medications  Current Outpatient Medications   Medication Instructions    aspirin 81 mg, oral, Daily    atenolol (TENORMIN) 12.5 mg, oral, Daily    cholecalciferol (Vitamin D-3) 5,000 Units tablet 1 tablet, oral, Daily    co-enzyme Q-10 30 mg, oral, Daily    diclofenac (VOLTAREN) 75 mg, oral, 2 times daily    docusate sodium (Colace) 100 mg capsule 1 capsule, oral, Daily    ferrous sulfate (325 mg ferrous sulfate) 325 mg, oral, Daily with breakfast    levothyroxine (SYNTHROID) 200 mcg, oral, Daily before breakfast, Take with 75 mcg dose    levothyroxine (SYNTHROID) 75 mcg, oral, Daily before breakfast, Take w/ 200 mcg dose    magnesium oxide (MAG-OX) 400 mg, oral, Daily  "   mv-min/FA/vit K/lutein/zeaxant (PRESERVISION AREDS 2 PLUS MV ORAL) 2 tablets, oral, Daily    nitroglycerin (NITROSTAT) 0.4 mg, sublingual, Every 5 min PRN    ramipril (ALTACE) 5 mg, oral, Daily    rosuvastatin (CRESTOR) 40 mg, oral, Daily    sodium fluoride-pot nitrate 1.1-5 % paste USE AS DIRECTED           Objective   24h Vitals  Heart Rate:  [86]   Temperature:  [36.7 °C (98.1 °F)]   Respirations:  [16]   BP: (148)/(68)   Height:  [177.8 cm (5' 10\")]   Weight:  [97.5 kg (215 lb)]   Pulse Ox:  [98 %]      Physical Exam  Neurological Exam  Physical Exam    Mental status: Mr. Harrell is awake, alert and appropriate, with fluent speech, but occasional word finding difficulty. For example he had difficulty with reading the word \"thanks.\" Reading ans speech are fairly fluent but occasionally he is unable to say a word.  He is well oriented to time, place and person. His memory for remote events is intact. His memory for recent events is normal. Attention and concentration is intact.  His fund of knowledge is fair. No apraxia is noted.  Cranial nerves:   CN 2: Pupils are equal, round and reactive to light bilaterally. Visual fields are full to confrontation without visual extinction.   CN 3, 4, 6: Extraocular movements are intact.  Smooth pursuit is intact.  Saccades are normal initiation, velocity and amplitude both vertically and horizontally.   CN 5: Facial sensation is intact to light touch bilaterally.  CN 7: Facial strength is full bilaterally.   CN 8: Hearing is intact to finger rub bilaterally.  CN 9, 10: The soft palate elevates symmetrically.   CN 11: Sternocleidomastoid and trapezius muscle strength is full bilaterally.  CN 12: Tongue protrudes in the midline with intact strength. There are no tongue fasciculations noted.              Motor Examination: Bulk is normal throughout.  No pronator drift is noted.  Strength testing as below.  No fasciculations are noted.  There is no tremor or other " involuntary movement.    Strength L R   Deltoid 5 5   Biceps 5 5   Triceps 5 5    5 5   Iliopsoas 5 5   Quadriceps 5 5   Hamstrings 5 5   TA 5 5   Gastroc 5 5     Sensory examination: Sensation is intact to light touch bilaterally.  Reflexes: Positioning during reflex exam: patient sitting in bed with legs extended on the bed. Reflex testing as below.    Reflexes L R   Biceps 2 2   Triceps 2 2   Brachioradialis 2 2   Patellar 2 2   Achilles 1 1     Coordination: Finger-nose-finger and heel-knee-shin testing is normal with no dysmetria bilaterally.  Gait: He has an upright and steady stance with normal stride length and arm swing.         NIHSS  Level of consciousness: 0 = Alert  LOC Question: 0 = Both correct  LOC Commands: 0 = Obeys both  Best Gaze: 0 = Normal  Visual Field: 0 = No visual loss  Facial Paresis: 0 = Normal  LUE: 0 = No drift; 10 sec  RUE: 0 = No drift; 10 sec  LLE: 0 = No drift; 5 sec  RLE: 0 = No drift; 5 sec  Dysarthria: 0 = Normal  Best Language: 1 = mild moderate  Limb Ataxia: 0 = Absent  Sensory: 0 = Absent  Neglect: 0 = None  NIHSS Score: 1     Recent Labs  Results from last 72 hours   Lab Units 12/13/24  1736   SODIUM mmol/L 140   POTASSIUM mmol/L 4.2   BUN mg/dL 22   CREATININE mg/dL 0.79   CALCIUM mg/dL 8.6      Results from last 72 hours   Lab Units 12/13/24  1736   WBC AUTO x10*3/uL 4.2*   HEMOGLOBIN g/dL 13.4*   HEMATOCRIT % 40.2*            Lab Results   Component Value Date    HGBA1C 5.6 01/08/2024    LDLF 46 09/28/2021    LDLF 27 07/23/2021    LDLF 40 09/22/2020        Imaging  CT head results: CT brain attack head wo IV contrast    Result Date: 12/13/2024  Interpreted By:  Evaristo Arevalo and Ogievich Taessa STUDY: CT BRAIN ATTACK HEAD WO IV CONTRAST;  12/13/2024 5:25 pm   INDICATION: Signs/Symptoms:Stroke Evaluation.     COMPARISON: None.   ACCESSION NUMBER(S): [id number]   ORDERING CLINICIAN: NABILA OCHOA   TECHNIQUE: Noncontrast axial CT scan of head was performed. Angled  reformats in brain and bone windows were generated. The images were reviewed in bone, brain, blood and soft tissue windows.   FINDINGS: CSF Spaces: There is prominence of ventricles and sulci compatible with diffuse mild parenchymal volume loss.  There is no extraaxial fluid collection.   Parenchyma: Scattered white matter hypodensities likely sequela of chronic microangiopathy. Several punctate hyperdensities in the right basal ganglia (series 201, image 20), likely from mineralization/calcifications. The grey-white differentiation is intact. There is no mass effect or midline shift.  There is no intracranial hemorrhage.   Calvarium: The calvarium is unremarkable.   Paranasal sinuses and mastoids: Visualized paranasal sinuses and mastoids are clear.       No evidence of acute cortical infarct or intracranial hemorrhage. If there is persistent clinical concern, recommend further evaluation with MRI.   I personally reviewed the images/study and I agree with the findings as stated by Xena Felix DO, PGY-3. This study was interpreted at [facility], [place], [place].   MACRO: Xena Felix discussed the significance and urgency of this critical finding in person with Efrem David on 12/13/2024 at 5:35 pm.  (**-RCF-**) Findings:  See findings.     Signed by: Evaristo Arevalo 12/13/2024 5:46 PM Dictation workstation:   [id number]             IV Thrombolysis IV Thrombolysis Checklist      IV Thrombolysis Given: No; Thrombolysis contraindication reason: Time from Last Known Well (or stroke onset) is >4.5 hours       Assessment/Plan   Baron Harrell is a 79 y.o. Right-handed male with history of CAD s/p CABG '91 (LIMA->LAD, SVG->PDA), s/p MI '91, HTN, HLD, TG, hypothyroid, MOHIT (CPAP) , presenting with expressive aphasia, neurology consulted for aphasia. Last known well 12:30 PM on 12/13. Outside of TNK window when examined. NIHSS was 1 initially for mild aphasia. CT head without  signs of acute stroke. He is pending MRI brain to confirm stroke.    Type: Cerebral Infarction and Unknown  Subtype: Cardioembolic  Vessels Involved:  to be determined  Neurologic Manifestations: aphasia  Deficits: NIHSS 1  Initial treatment: None  Anti-platelets or Anti-coagulation management: Aspirin  Vascular Risk Factors: HTN and HLD  BP goal: < 220  Disposition: Home     Recommendations:  Stroke workup  MRI brain w/o contrast stroke protocol  MRA head and neck  EKG, troponin  Utox  TTE w/ bubble study  Lipid panel, A1c  ASA 81mg daily  Rosuvastatin 40mg  sBP goal < 220  Consider focused stroke order set     Updated recommendations 9:56 PM  - MRI no stroke. Vessel imaging also clear.   - Obtain routine EEG, if no seizures then okay to discharge home    Vascular Risk Factor modification goals:  Blood pressure goals: avoid hypotension SBP <100 that could worsen cerebral perfusion, Ischemic stroke- early permissive hypertension SBP < 220 mmHg with cautious inpatient lowering  Lipid Goals: education on healthy diet and statin therapy to maintain or achieve goal LDL-cholesterol < 70mg  Glucose Goals: early treatment of hyperglycemia to goal glucose 140-180 mg/dl with long-term goal A1c < 7%   Smoking Cessation and Education  Assessment for Rehabilitation needs including PT/OT and if indicated swallow evaluation and speech therapy   Patient and family education on signs and symptoms of stroke, calling 911, risk factors, and healthy strategies for stroke prevention.      Donald Galan MD PhD  PGY-2 Neurology  Stroke [phone]    Recommendations are not final until formally staffed with the attending physician, Dr. Peters.

## 2024-12-13 NOTE — ED TRIAGE NOTES
Sent from [facility] appt for difficulty word finding, no other deficits. Seen by provider in triage, no BAT

## 2024-12-14 ENCOUNTER — APPOINTMENT (OUTPATIENT)
Dept: CARDIOLOGY | Facility: HOSPITAL | Age: 79
End: 2024-12-14
Payer: MEDICARE

## 2024-12-14 VITALS
HEART RATE: 59 BPM | HEIGHT: 70 IN | BODY MASS INDEX: 30.78 KG/M2 | DIASTOLIC BLOOD PRESSURE: 78 MMHG | SYSTOLIC BLOOD PRESSURE: 135 MMHG | TEMPERATURE: 98.4 F | WEIGHT: 215 LBS | RESPIRATION RATE: 14 BRPM | OXYGEN SATURATION: 97 %

## 2024-12-14 LAB
AORTIC VALVE PEAK VELOCITY: 1.33 M/S
AV PEAK GRADIENT: 7 MMHG
AVA (PEAK VEL): 3.33 CM2
EJECTION FRACTION APICAL 4 CHAMBER: 50.8
EJECTION FRACTION: 58 %
LEFT ATRIUM VOLUME AREA LENGTH INDEX BSA: 23.4 ML/M2
LEFT VENTRICLE INTERNAL DIMENSION DIASTOLE: 4 CM (ref 3.5–6)
LEFT VENTRICULAR OUTFLOW TRACT DIAMETER: 2.1 CM
MITRAL VALVE E/A RATIO: 0.87
RIGHT VENTRICLE FREE WALL PEAK S': 9.11 CM/S
RIGHT VENTRICLE PEAK SYSTOLIC PRESSURE: 25.8 MMHG
TRICUSPID ANNULAR PLANE SYSTOLIC EXCURSION: 2.2 CM

## 2024-12-14 PROCEDURE — 93010 ELECTROCARDIOGRAM REPORT: CPT | Performed by: INTERNAL MEDICINE

## 2024-12-14 PROCEDURE — G0378 HOSPITAL OBSERVATION PER HR: HCPCS

## 2024-12-14 PROCEDURE — 2500000001 HC RX 250 WO HCPCS SELF ADMINISTERED DRUGS (ALT 637 FOR MEDICARE OP)

## 2024-12-14 PROCEDURE — 2500000002 HC RX 250 W HCPCS SELF ADMINISTERED DRUGS (ALT 637 FOR MEDICARE OP, ALT 636 FOR OP/ED)

## 2024-12-14 PROCEDURE — 93306 TTE W/DOPPLER COMPLETE: CPT

## 2024-12-14 PROCEDURE — 93306 TTE W/DOPPLER COMPLETE: CPT | Performed by: INTERNAL MEDICINE

## 2024-12-14 PROCEDURE — 93005 ELECTROCARDIOGRAM TRACING: CPT

## 2024-12-14 PROCEDURE — 99238 HOSP IP/OBS DSCHRG MGMT 30/<: CPT | Performed by: NURSE PRACTITIONER

## 2024-12-14 PROCEDURE — 2500000001 HC RX 250 WO HCPCS SELF ADMINISTERED DRUGS (ALT 637 FOR MEDICARE OP): Performed by: NURSE PRACTITIONER

## 2024-12-14 RX ORDER — CLOPIDOGREL BISULFATE 75 MG/1
300 TABLET ORAL ONCE
Status: COMPLETED | OUTPATIENT
Start: 2024-12-14 | End: 2024-12-14

## 2024-12-14 RX ORDER — CLOPIDOGREL BISULFATE 75 MG/1
75 TABLET ORAL DAILY
Qty: 90 TABLET | Refills: 0 | Status: SHIPPED | OUTPATIENT
Start: 2024-12-14 | End: 2025-03-14

## 2024-12-14 RX ADMIN — ROSUVASTATIN CALCIUM 40 MG: 20 TABLET, FILM COATED ORAL at 00:25

## 2024-12-14 RX ADMIN — ASPIRIN 81 MG: 81 TABLET, COATED ORAL at 00:25

## 2024-12-14 RX ADMIN — CLOPIDOGREL BISULFATE 300 MG: 75 TABLET ORAL at 11:41

## 2024-12-14 RX ADMIN — ACETAMINOPHEN 650 MG: 325 TABLET ORAL at 00:25

## 2024-12-14 RX ADMIN — FERROUS SULFATE TAB 325 MG (65 MG ELEMENTAL FE) 325 MG: 325 (65 FE) TAB at 09:18

## 2024-12-14 RX ADMIN — ATENOLOL 12.5 MG: 25 TABLET ORAL at 09:23

## 2024-12-14 RX ADMIN — LEVOTHYROXINE SODIUM 75 MCG: 0.07 TABLET ORAL at 09:20

## 2024-12-14 RX ADMIN — LISINOPRIL 10 MG: 10 TABLET ORAL at 09:25

## 2024-12-14 RX ADMIN — LEVOTHYROXINE SODIUM 200 MCG: 100 TABLET ORAL at 09:18

## 2024-12-14 ASSESSMENT — PAIN DESCRIPTION - LOCATION
LOCATION: BACK
LOCATION: BACK

## 2024-12-14 ASSESSMENT — PAIN SCALES - GENERAL: PAINLEVEL_OUTOF10: 2

## 2024-12-14 ASSESSMENT — PAIN DESCRIPTION - PAIN TYPE: TYPE: CHRONIC PAIN

## 2024-12-14 NOTE — SIGNIFICANT EVENT
Updated stroke recommendations    Patient with transient aphasia without other motor/sensory symptoms.  MRI negative for stroke, CTA without significant stenosis intracranially or extracranially.  Echocardiogram negative for PFO/cardioembolic source.        -No need for EEG  -Please give one-time dose of Plavix 300 mg  -Treat with dual antiplatelet, aspirin/Plavix for 21 days then continue Plavix 75 mg monotherapy indefinitely  -Zio patch on discharge  -Neurology follow-up for TIA after completion of Holter monitor  -Return precautions   -Continue home statin  -Patient discussed with attending Dr. Lorraine Ortiz MD PhD  PGY-4 Neurology

## 2024-12-14 NOTE — ED NOTES
Pt @ MRI for 2030 assessment. Assessment completed upon return to room.      Jessy Mcconnell RN  12/13/24 2042

## 2024-12-14 NOTE — H&P
History and Physical  [facility] CLINICAL DECISION  Patient: Baron Harrell  MRN: [id number]  : 1945  Date of Evaluation: 2024  ED Provider: JAELN Kelley      Limitations to history: None  Independent Historian: Yes  External Records Reviewed: Yes      Patient History:  Baron Harrell is a 79 y.o. male with past medical history significant for CAD s/p CABG (), MI (), HTN, HLD, TG, hypothyroid, MOHIT (CPAP) , admitted to the clinical decision unit for TIA.  Patient evaluated in the emergency department for concerns of having expressive aphasia. Patient states approximately 12 PM today, had difficulty finding words and reading words.  He denies headache, dizziness, slurred speech, weakness shortness of breath, chest pain, nausea, vomiting.  Initially a bat was called, neurology cleared.      The acute medial evaluation while in the Emergency Department consist of an EKG, labs and Imagining.  -> EKG: Initial EKG not provided from Emergency Department  ->CBC: WBCs low at 4.2, no acute anemia  ->CMP: No significant abnormalities.  No electrolyte derangement, JOSE or hepatic abnormalities.  -Troponin: Within normal range at 3  -> A1c: Within normal range at 5.2  -> PT/INR: No significant abnormalities  -> APTT: Within normal range      ->CTH: No evidence of acute cortical infarct or intracranial hemorrhage.   ->CT angio head and neck: 1. No acute intracranial abnormality and No hemodynamically significant intracranial or extracranial  stenosis, occlusion, or aneurysm.  ->MR brain:  1. No evidence of acute infarct, intracranial mass effect or midline  shift.  2. T2 hyperintense foci in the midbrain may reflect sites of prior  lacunar infarcts or prominent perivascular spaces.  3. Moderate chronic white matter microangiopathic changes and  generalized parenchymal volume loss.        Neurology was consulted and recommended   Stroke workup  MRI brain w/o contrast stroke  protocol  MRA head and neck  EKG, troponin  Utox  TTE w/ bubble study  Lipid panel, A1c  ASA 81mg daily  Rosuvastatin 40mg  sBP goal < 220  Consider focused stroke order set .    After discussion with the ED provider, a decision was made to admit the patient to the Clinical Decision Unit for TIA.  Plan is completed EEG and completed a TIA workup including a complete echocardiogram.      In the emergency department, the acute evaluation included:  Orders Placed This Encounter   Procedures    CT brain attack head wo IV contrast    CT angio head and neck w and wo IV contrast    MR brain wo IV contrast    CBC and Auto Differential    Comprehensive metabolic panel    Troponin I, High Sensitivity    Protime-INR    APTT    Lipid Panel    Hemoglobin A1c    Vital Signs    Neuro checks    Cardiac Monitoring - ED/PACU Only    Nursing swallow assessment    NIH Stroke Scale assessment by physician    Pulse oximetry, continuous    POCT GLUCOSE    POCT GLUCOSE    ECG 12 lead    Insert and maintain peripheral IV    Initiate Observation Send to CDU       I reviewed the below labs and imaging as ordered by the ED provider:  CT angio head and neck w and wo IV contrast   Final Result   1. No acute intracranial abnormality.   2. No hemodynamically significant intracranial or extracranial   stenosis, occlusion, or aneurysm.             I personally reviewed the images/study and I agree with the findings   as stated by Dr. Olvin Lamar. This study was interpreted at   [place], [place].        MACRO:   None.        Signed by: Reynaldo Verduzco 12/13/2024 10:13 PM   Dictation workstation:   [id number]      MR brain wo IV contrast   Final Result   1. No evidence of acute infarct, intracranial mass effect or midline   shift.   2. T2 hyperintense foci in the midbrain may reflect sites of prior   lacunar infarcts or prominent perivascular spaces.   3. Moderate chronic white matter microangiopathic  changes and   generalized parenchymal volume loss.             I personally reviewed the images/study and I agree with the findings   as stated above by resident physician, Marito Garibay MD. This study   was interpreted at [place], [place].        MACRO:   None.        Signed by: Suzette Coleman 12/13/2024 9:50 PM   Dictation workstation:   [id number]      CT brain attack head wo IV contrast   Final Result   No evidence of acute cortical infarct or intracranial hemorrhage. If   there is persistent clinical concern, recommend further evaluation   with MRI.        I personally reviewed the images/study and I agree with the findings   as stated by Xena Felix DO, PGY-3. This study was interpreted   at [place], [place].        MACRO:   Xena Felix discussed the significance and urgency of this   critical finding in person with Efrem Hernandez on 12/13/2024 at 5:35   pm.  (**-RCF-**) Findings:  See findings.             Signed by: Evaristo Arevalo 12/13/2024 5:46 PM   Dictation workstation:   [id number]          Labs Reviewed   CBC WITH AUTO DIFFERENTIAL - Abnormal       Result Value    WBC 4.2 (*)     nRBC 0.0      RBC 4.37 (*)     Hemoglobin 13.4 (*)     Hematocrit 40.2 (*)     MCV 92      MCH 30.7      MCHC 33.3      RDW 13.2      Platelets        Neutrophils % 66.4      Immature Granulocytes %, Automated 0.2      Lymphocytes % 20.5      Monocytes % 7.9      Eosinophils % 4.8      Basophils % 0.2      Neutrophils Absolute 2.79      Immature Granulocytes Absolute, Automated 0.01      Lymphocytes Absolute 0.86      Monocytes Absolute 0.33      Eosinophils Absolute 0.20      Basophils Absolute 0.01     COMPREHENSIVE METABOLIC PANEL - Abnormal    Glucose 122 (*)     Sodium 140      Potassium 4.2      Chloride 107      Bicarbonate 24      Anion Gap 13      Urea Nitrogen 22      Creatinine 0.79      eGFR >90      Calcium  8.6      Albumin 4.1      Alkaline Phosphatase 66      Total Protein 6.4      AST 23      Bilirubin, Total 0.6      ALT 27     PROTIME-INR - Abnormal    Protime 13.4 (*)     INR 1.2 (*)    LIPID PANEL - Abnormal    Cholesterol 93      HDL-Cholesterol 22.9      Cholesterol/HDL Ratio 4.1      LDL Calculated 24      VLDL 46 (*)     Triglycerides 229 (*)     Non HDL Cholesterol 70     POCT GLUCOSE - Abnormal    POCT Glucose 144 (*)    POCT GLUCOSE - Abnormal    POCT Glucose 123 (*)    TROPONIN I, HIGH SENSITIVITY - Normal    Troponin I, High Sensitivity (CMC) 3      Narrative:     Less than 99th percentile of normal range cutoff-  Female and children under 18 years old <35 ng/L; Male <54 ng/L: Negative  Repeat testing should be performed if clinically indicated.     Female and children under 18 years old  ng/L; Male  ng/L:  Consistent with possible cardiac damage and possible increased clinical   risk. Serial measurements may help to assess extent of myocardial damage.     >120 ng/L: Consistent with cardiac damage, increased clinical risk and  myocardial infarction. Serial measurements may help assess extent of   myocardial damage.      NOTE: Children less than 1 year old may have higher baseline troponin   levels and results should be interpreted in conjunction with the overall   clinical context.    NOTE: Troponin I testing is performed using a different   testing methodology at [facility] [place] than at other   [facility]. Direct result comparisons should only   be made within the same method.     APTT - Normal    aPTT 32      Narrative:     The APTT is no longer used for monitoring Unfractionated Heparin Therapy. For monitoring Heparin Therapy, use the Heparin Assay.   HEMOGLOBIN A1C    Hemoglobin A1C 5.2      Estimated Average Glucose 103      Narrative:     Diagnosis of Diabetes-Adults  Non-Diabetic: < or = 5.6%  Increased risk for developing diabetes: 5.7-6.4%  Diagnostic of diabetes: >  or = 6.5%       POCT GLUCOSE METER   MORPHOLOGY    RBC Morphology No significant RBC morphology present         Upon admission to the Clinical Decision Unit,  Baron Harrell is alert and oriented and appears in no acute distress.  He denies headache, dizziness, slurred speech, weakness shortness of breath, chest pain, nausea, vomiting. Vital signs were reviewed and stable.  Upon examination, patient is alert to person place and time no neurological deficits.  Follows commands and answers questions appropriately.  Speech clear and fluent.  No weakness to extremities.  Will complete a TIA workup and an EEG.  Neurology following.  Informed of plan of care and agreed with plan.    Past History   No past medical history on file.  No past surgical history on file.  Social History     Socioeconomic History    Marital status:    Tobacco Use    Smoking status: Former     Current packs/day: 0.00     Average packs/day: 0.3 packs/day for 5.0 years (1.3 ttl pk-yrs)     Types: Cigarettes     Start date: 1966     Quit date: 1971     Years since quittin.9     Passive exposure: Past    Smokeless tobacco: Never   Substance and Sexual Activity    Alcohol use: Not Currently     Comment: social    Drug use: Not Currently     Social Drivers of Health     Financial Resource Strain: Not on File (3/17/2024)    [facility] from [facility]    Financial Resource Strain     Financial Resource Strain: 0   Food Insecurity: Not on File (3/17/2024)    Received from [facility]    Food Insecurity     Food: 0   Transportation Needs: Not on File (3/17/2024)    Received from [facility]    Transportation Needs     Transportation: 0   Physical Activity: Not on File (3/17/2024)    Received from [facility]    Physical Activity     Physical Activity: 0   Stress: Not on File (3/17/2024)    Received from [facility]    Stress     Stress: 0   Social Connections: Not on File (3/17/2024)    Received from [facility]    Social  Connections     Social Connections and Isolation: 0   Housing Stability: Not on File (3/17/2024)    Received from [facility]    Housing Stability     Housin         Medications/Allergies     Previous Medications    ASPIRIN 81 MG EC TABLET    Take 1 tablet (81 mg) by mouth once daily.    ATENOLOL (TENORMIN) 25 MG TABLET    Take 0.5 tablets (12.5 mg) by mouth once daily.    CHOLECALCIFEROL (VITAMIN D-3) 5,000 UNITS TABLET    Take 1 tablet (5,000 Units) by mouth once daily.    CO-ENZYME Q-10 30 MG CAPSULE    Take 1 capsule (30 mg) by mouth once daily.    DICLOFENAC (VOLTAREN) 75 MG EC TABLET    Take 1 tablet (75 mg) by mouth 2 times a day.    DOCUSATE SODIUM (COLACE) 100 MG CAPSULE    Take 1 capsule (100 mg) by mouth once daily.    FERROUS SULFATE, 325 MG FERROUS SULFATE, TABLET    Take 1 tablet (325 mg) by mouth once daily with breakfast.    LEVOTHYROXINE (SYNTHROID) 200 MCG TABLET    Take 1 tablet (200 mcg) by mouth once daily in the morning. Take before meals. Take with 75 mcg dose    LEVOTHYROXINE (SYNTHROID) 75 MCG TABLET    Take 1 tablet (75 mcg) by mouth once daily in the morning. Take before meals. Take w/ 200 mcg dose    MAGNESIUM OXIDE (MAG-OX) 400 MG TABLET    Take 1 tablet (400 mg) by mouth once daily.    MV-MIN/FA/VIT K/LUTEIN/ZEAXANT (PRESERVISION AREDS 2 PLUS MV ORAL)    Take 2 tablets by mouth once daily.    NITROGLYCERIN (NITROSTAT) 0.4 MG SL TABLET    Place 1 tablet (0.4 mg) under the tongue every 5 minutes if needed for chest pain.    RAMIPRIL (ALTACE) 5 MG CAPSULE    Take 1 capsule (5 mg) by mouth once daily.    ROSUVASTATIN (CRESTOR) 40 MG TABLET    Take 1 tablet (40 mg) by mouth once daily.    SODIUM FLUORIDE-POT NITRATE 1.1-5 % PASTE    USE AS DIRECTED     Allergies   Allergen Reactions    Diphenhydramine Hallucinations and Other     Hallucinations             Review of systems:  All systems reviewed and otherwise negative, except as stated above in HPI.        Physical Exam     Visit  "Vitals  /71   Pulse 61   Temp 36.7 °C (98.1 °F)   Resp 17   Ht 1.778 m (5' 10\")   Wt 97.5 kg (215 lb)   SpO2 96%   BMI 30.85 kg/m²   Smoking Status Former   BSA 2.19 m²       GENERAL:  The patient appears nontoxic, nourished and normally developed. Vital signs as documented.     HEENT:  Head normocephalic, atraumatic, no asymmetry or drooling.  EOMs intact, Mucous membranes moist. Nares patent without copious rhinorrhea.  Oropharynx moist and clear.  Uvula midline.    Neck: Supple.  No meningismus.  No swelling.  Trachea midline. No lymphadenopathy.    Pulmonary:  Lungs are clear to auscultation, without any respiratory distress.  No wheezing, crackles or rales.  No hypoxia or dyspnea.  Able to speak full sentences, no accessory muscle use.    Cardiac:  Regular rate and rhythm. No murmurs, rubs or gallops.  No JVD.    GI:  Soft, non-distended, non-tender, BS positive x 4 quadrants, No rebound or guarding, no peritoneal signs, no CVA tenderness, no masses or organomegaly.    Musculoskeletal: Symmetrical muscle bulk. No peripheral edema.  Pulses intact distal.  Able to walk.    Integumentary: Warm, dry and intact.  No pallor or jaundice.  No lesions, rashes or open sores.    NEURO:  No obvious neurological deficits, no expressive aphasia, normal sensation and strength bilaterally.  Moves all extremities spontaneously.  Speech clear and fluent.  Able to follow commands, CN 2-12 intact.    Psych: Appropriate mood and affect.    Consultants:  1.)  Neurology            Impression and Plan  In summary, Baron Harrell is admitted to the [facility] Clinical Decision Unit for TIA. Dr. Lillian Darden is the CDU admission attending.    This patient has been risk-stratified based on available history, physical exam, and related study findings. Admission to the observation status for further diagnosis/treatment/monitoring of TIA is warranted clinically. This extended period of observation is " specifically required to determine the need for hospitalization.     The goals of this admission based on the patient’s clinical problem list are:   1.)  TIA  -Continuous telemetry  -Neurochecks every 4 hours  -EKG  -Complete TTE with bubble study  -Lipid panel  -Swallowing eval passed  -DVT prophylaxis  -Resume home medications        We will observe the patient for the following endpoints:   1.) Stable vitals  2.)  Symptomatic improvement.  3.)  Cleared by neurology    When met, appropriate disposition will be arranged.    CIELO Kelley-CNP  [facility]  Clinical Decision Unit  [place] [id number]     Emergency Medicine Attending Attestation:     Diagnoses as of 12/22/24 2689   TIA (transient ischemic attack)   Expressive aphasia       This patient was seen by the advanced practice provider.  I have personally performed a substantive portion of the encounter.  I have seen and examined the patient; agree with the workup, evaluation, MDM, management and diagnosis.  The care plan has been discussed.      I personally saw the patient and made/approved the management plan and take responsibility for the patient management.    History:   Patient with episodes of expressive aphasia  Stroke work up so far negative   Exam:   No focal weakness, no slurred speech  Some word aphasia but clear speech - not slurred    MDM:   [] neuro to follow  [] eeg             I independently interpreted patient's EKG and agree with the above mentioned interpretation.        Lillian Darden MD

## 2024-12-14 NOTE — DISCHARGE SUMMARY
Disposition Note  [facility] CLINICAL DECISION  Patient: Baron Harrell  MRN: [id number]  : 1945  Date of Evaluation: 2024  ED Provider: CIELO Sanches-CNP, DEYANIRA      Limitations to history: None  Independent Historian: Yes  External Records Reviewed: EMR       Subjective:    Baron Harrell is a 79 y.o. male has undergone comprehensive diagnostic evaluation and therapeutic management in accordance with the CDU guidelines for . Based on the patient's clinical response and diagnostic information during this period of observation, it has been determined that the patient will be discharged.     The acute evaluation included:  Orders Placed This Encounter   Procedures    CT brain attack head wo IV contrast    CT angio head and neck w and wo IV contrast    MR brain wo IV contrast    CBC and Auto Differential    Comprehensive metabolic panel    Troponin I, High Sensitivity    Protime-INR    APTT    Lipid Panel    Hemoglobin A1c    Referral to Neurology    Adult diet Regular, Cardiac; 70 gm fat; 2 - 3 grams Sodium    Vital Signs    Neuro checks    Cardiac Monitoring - ED/PACU Only    Nursing swallow assessment    NIH Stroke Scale assessment by physician    Neuro checks    Nursing swallow assessment    Activity (specify) No Restrictions    Vital Signs    Full code    Pulse oximetry, continuous    POCT GLUCOSE    POCT GLUCOSE    ECG 12 lead    Electrocardiogram, 12-lead PRN ACS symptoms    ECG 12 lead    Transthoracic Echo (TTE) Complete    Insert and maintain peripheral IV    Insert and maintain peripheral IV    Initiate Observation Send to CDU         Placed in observation at: 24       Past History   History reviewed. No pertinent past medical history.  History reviewed. No pertinent surgical history.  Social History     Socioeconomic History    Marital status:    Tobacco Use    Smoking status: Former     Current packs/day: 0.00     Average packs/day: 0.3 packs/day for  5.0 years (1.3 ttl pk-yrs)     Types: Cigarettes     Start date: 1966     Quit date: 1971     Years since quittin.9     Passive exposure: Past    Smokeless tobacco: Never   Substance and Sexual Activity    Alcohol use: Not Currently     Comment: social    Drug use: Not Currently     Social Drivers of Health     Financial Resource Strain: Not on File (3/17/2024)    Received from [facility]    Financial Resource Strain     Financial Resource Strain: 0   Food Insecurity: Not on File (3/17/2024)    Received from [facility]    Food Insecurity     Food: 0   Transportation Needs: Not on File (3/17/2024)    Received from [facility]    Transportation Needs     Transportation: 0   Physical Activity: Not on File (3/17/2024)    Received from [facility]    Physical Activity     Physical Activity: 0   Stress: Not on File (3/17/2024)    Received from [facility]    Stress     Stress: 0   Social Connections: Not on File (3/17/2024)    Received from [facility]    Social Connections     Social Connections and Isolation: 0   Housing Stability: Not on File (3/17/2024)    Received from [facility]    Housing Stability     Housin         Medications/Allergies     Previous Medications    ASPIRIN 81 MG EC TABLET    Take 1 tablet (81 mg) by mouth once daily.    ATENOLOL (TENORMIN) 25 MG TABLET    Take 0.5 tablets (12.5 mg) by mouth once daily.    CHOLECALCIFEROL (VITAMIN D-3) 5,000 UNITS TABLET    Take 1 tablet (5,000 Units) by mouth once daily.    CO-ENZYME Q-10 30 MG CAPSULE    Take 1 capsule (30 mg) by mouth once daily.    DICLOFENAC (VOLTAREN) 75 MG EC TABLET    Take 1 tablet (75 mg) by mouth 2 times a day.    DOCUSATE SODIUM (COLACE) 100 MG CAPSULE    Take 1 capsule (100 mg) by mouth once daily.    FERROUS SULFATE, 325 MG FERROUS SULFATE, TABLET    Take 1 tablet (325 mg) by mouth once daily with breakfast.    LEVOTHYROXINE (SYNTHROID) 200 MCG TABLET    Take 1 tablet (200 mcg) by mouth once daily in the  morning. Take before meals. Take with 75 mcg dose    LEVOTHYROXINE (SYNTHROID) 75 MCG TABLET    Take 1 tablet (75 mcg) by mouth once daily in the morning. Take before meals. Take w/ 200 mcg dose    MAGNESIUM OXIDE (MAG-OX) 400 MG TABLET    Take 1 tablet (400 mg) by mouth once daily.    MV-MIN/FA/VIT K/LUTEIN/ZEAXANT (PRESERVISION AREDS 2 PLUS MV ORAL)    Take 2 tablets by mouth once daily.    NITROGLYCERIN (NITROSTAT) 0.4 MG SL TABLET    Place 1 tablet (0.4 mg) under the tongue every 5 minutes if needed for chest pain.    RAMIPRIL (ALTACE) 5 MG CAPSULE    Take 1 capsule (5 mg) by mouth once daily.    ROSUVASTATIN (CRESTOR) 40 MG TABLET    Take 1 tablet (40 mg) by mouth once daily.    SODIUM FLUORIDE-POT NITRATE 1.1-5 % PASTE    USE AS DIRECTED     Allergies   Allergen Reactions    Diphenhydramine Hallucinations and Other     Hallucinations         Review of Systems  All systems reviewed and otherwise negative, except as stated above in HPI.    Diagnostics reviewed by CIELO Sanches-CNP, DNP     Labs:  Results for orders placed or performed during the hospital encounter of 12/13/24   POCT GLUCOSE    Collection Time: 12/13/24  5:04 PM   Result Value Ref Range    POCT Glucose 144 (H) 74 - 99 mg/dL   CBC and Auto Differential    Collection Time: 12/13/24  5:36 PM   Result Value Ref Range    WBC 4.2 (L) 4.4 - 11.3 x10*3/uL    nRBC 0.0 0.0 - 0.0 /100 WBCs    RBC 4.37 (L) 4.50 - 5.90 x10*6/uL    Hemoglobin 13.4 (L) 13.5 - 17.5 g/dL    Hematocrit 40.2 (L) 41.0 - 52.0 %    MCV 92 80 - 100 fL    MCH 30.7 26.0 - 34.0 pg    MCHC 33.3 32.0 - 36.0 g/dL    RDW 13.2 11.5 - 14.5 %    Platelets      Neutrophils % 66.4 40.0 - 80.0 %    Immature Granulocytes %, Automated 0.2 0.0 - 0.9 %    Lymphocytes % 20.5 13.0 - 44.0 %    Monocytes % 7.9 2.0 - 10.0 %    Eosinophils % 4.8 0.0 - 6.0 %    Basophils % 0.2 0.0 - 2.0 %    Neutrophils Absolute 2.79 1.60 - 5.50 x10*3/uL    Immature Granulocytes Absolute, Automated 0.01 0.00 - 0.50  x10*3/uL    Lymphocytes Absolute 0.86 0.80 - 3.00 x10*3/uL    Monocytes Absolute 0.33 0.05 - 0.80 x10*3/uL    Eosinophils Absolute 0.20 0.00 - 0.40 x10*3/uL    Basophils Absolute 0.01 0.00 - 0.10 x10*3/uL   Comprehensive metabolic panel    Collection Time: 12/13/24  5:36 PM   Result Value Ref Range    Glucose 122 (H) 74 - 99 mg/dL    Sodium 140 136 - 145 mmol/L    Potassium 4.2 3.5 - 5.3 mmol/L    Chloride 107 98 - 107 mmol/L    Bicarbonate 24 21 - 32 mmol/L    Anion Gap 13 10 - 20 mmol/L    Urea Nitrogen 22 6 - 23 mg/dL    Creatinine 0.79 0.50 - 1.30 mg/dL    eGFR >90 >60 mL/min/1.73m*2    Calcium 8.6 8.6 - 10.6 mg/dL    Albumin 4.1 3.4 - 5.0 g/dL    Alkaline Phosphatase 66 33 - 136 U/L    Total Protein 6.4 6.4 - 8.2 g/dL    AST 23 9 - 39 U/L    Bilirubin, Total 0.6 0.0 - 1.2 mg/dL    ALT 27 10 - 52 U/L   Troponin I, High Sensitivity    Collection Time: 12/13/24  5:36 PM   Result Value Ref Range    Troponin I, High Sensitivity (CMC) 3 0 - 53 ng/L   Lipid Panel    Collection Time: 12/13/24  5:36 PM   Result Value Ref Range    Cholesterol 93 0 - 199 mg/dL    HDL-Cholesterol 22.9 mg/dL    Cholesterol/HDL Ratio 4.1     LDL Calculated 24 <=99 mg/dL    VLDL 46 (H) 0 - 40 mg/dL    Triglycerides 229 (H) 0 - 149 mg/dL    Non HDL Cholesterol 70 0 - 149 mg/dL   Hemoglobin A1c    Collection Time: 12/13/24  5:36 PM   Result Value Ref Range    Hemoglobin A1C 5.2 See comment %    Estimated Average Glucose 103 Not Established mg/dL   Morphology    Collection Time: 12/13/24  5:36 PM   Result Value Ref Range    RBC Morphology No significant RBC morphology present    POCT GLUCOSE    Collection Time: 12/13/24  5:38 PM   Result Value Ref Range    POCT Glucose 123 (H) 74 - 99 mg/dL   Protime-INR    Collection Time: 12/13/24  6:30 PM   Result Value Ref Range    Protime 13.4 (H) 9.8 - 12.8 seconds    INR 1.2 (H) 0.9 - 1.1   APTT    Collection Time: 12/13/24  6:30 PM   Result Value Ref Range    aPTT 32 27 - 38 seconds   Transthoracic Echo  (TTE) Complete    Collection Time: 12/14/24  9:06 AM   Result Value Ref Range    AV pk gt 1.33 m/s    LVOT diam 2.10 cm    MV E/A ratio 0.87     LA vol index A/L 23.4 ml/m2    Tricuspid annular plane systolic excursion 2.2 cm    LV EF 58 %    RV free wall pk S' 9.11 cm/s    LVIDd 4.00 cm    RVSP 25.8 mmHg    Aortic Valve Area by Continuity of Peak Velocity 3.33 cm2    AV pk grad 7 mmHg    LV A4C EF 50.8      Radiographs:  Transthoracic Echo (TTE) Complete   Final Result      CT angio head and neck w and wo IV contrast   Final Result   1. No acute intracranial abnormality.   2. No hemodynamically significant intracranial or extracranial   stenosis, occlusion, or aneurysm.             I personally reviewed the images/study and I agree with the findings   as stated by Dr. Olvin Lamar. This study was interpreted at   [place], [place].        MACRO:   None.        Signed by: Reynaldo Verduzco 12/13/2024 10:13 PM   Dictation workstation:   [id number]      MR brain wo IV contrast   Final Result   1. No evidence of acute infarct, intracranial mass effect or midline   shift.   2. T2 hyperintense foci in the midbrain may reflect sites of prior   lacunar infarcts or prominent perivascular spaces.   3. Moderate chronic white matter microangiopathic changes and   generalized parenchymal volume loss.             I personally reviewed the images/study and I agree with the findings   as stated above by resident physician, Marito Garibay MD. This study   was interpreted at [place], [place].        MACRO:   None.        Signed by: Suzette Coleman 12/13/2024 9:50 PM   Dictation workstation:   [id number]      CT brain attack head wo IV contrast   Final Result   No evidence of acute cortical infarct or intracranial hemorrhage. If   there is persistent clinical concern, recommend further evaluation   with MRI.        I personally reviewed the  "images/study and I agree with the findings   as stated by Xena Felix DO, PGY-3. This study was interpreted   at [facility], [place], [place].        MACRO:   Xena Felix discussed the significance and urgency of this   critical finding in person with Efrem Hernandez on 12/13/2024 at 5:35   pm.  (**-RCF-**) Findings:  See findings.             Signed by: Evaristo Arevalo 12/13/2024 5:46 PM   Dictation workstation:   [id number]              Physical Exam     Visit Vitals  /78 (BP Location: Right arm, Patient Position: Lying)   Pulse 59   Temp 36.9 °C (98.4 °F) (Oral)   Resp 14   Ht 1.778 m (5' 10\")   Wt 97.5 kg (215 lb)   SpO2 97%   BMI 30.85 kg/m²   Smoking Status Former   BSA 2.19 m²       GENERAL:  The patient appears nourished and normally developed. Vital signs as documented.     HEENT:  Head normocephalic, atraumatic, EOMs intact, PERRLA, Mucous membranes moist. Nares patent without copious rhinorrhea.  No lymphadenopathy.    PULMONARY:  Lungs are clear to auscultation, without any respiratory distress. Able to speak full sentences, no accessory muscle use    CARDIAC:   Normal rate. No murmurs, rubs or gallops    ABDOMEN:  Soft, non-distended, non-tender, BS positive x 4 quadrants, No rebound or guarding, no peritoneal signs, no CVA tenderness, no masses or organomegaly    MUSCULOSKELETAL:   Able to ambulate, Non edematous, with no obvious deformities. Pulses intact distal    SKIN:   Good color, with no significant rashes.  No pallor.    NEURO:  No obvious neurological deficits, normal sensation and strength bilaterally.  Able to follow commands, NIH 0, CN 2-12 intact.    Psych: Appropriate mood and affect    Consultants  1) neurology       Impression and Plan    In summary, Baron Harrell has been cared for according to the standard [facility] Clinical Decision Unit observation protocol for TIA (transient ischemic attack). This " extended period of observation was specifically required to determine the need for hospitalization. Prior to discharge from observation, the final physical exam is documented above.     Significant events during the course of observation based on the goals of the clinical problem list include:   1) TIA   Patient was admitted to the CDU for TIA work up. His echocardiogram showed no PFO and he was loaded with 300 mg of Plavix.   Neurology also recommended a zio patch but I was not able to get a hold of Zio patch staff. He does have a cardiologist (Dr. Dupree) and he will call him on Monday to have this ordered. I also discussed this with Dr. Ortiz from neurology.        Based on the patient's condition and test results, the patient will be discharged.     Total length of observation was 15 hours. Dr. Mireles is the CDU disposition attending.      Discharge Diagnosis  TIA (transient ischemic attack)    Issues Requiring Follow-Up  TIA   An appointment was made with neurology for him and he was instructed to follow up with his cardiologist Dr. Dupree first thing on Monday.     Discharge Meds     Your medication list        START taking these medications        Instructions Last Dose Given Next Dose Due   clopidogrel 75 mg tablet  Commonly known as: Plavix      Take 1 tablet (75 mg) by mouth once daily.              ASK your doctor about these medications        Instructions Last Dose Given Next Dose Due   aspirin 81 mg EC tablet           atenolol 25 mg tablet  Commonly known as: Tenormin      Take 0.5 tablets (12.5 mg) by mouth once daily.       cholecalciferol 5,000 Units tablet  Commonly known as: Vitamin D-3           co-enzyme Q-10 30 mg capsule           diclofenac 75 mg EC tablet  Commonly known as: Voltaren      Take 1 tablet (75 mg) by mouth 2 times a day.       docusate sodium 100 mg capsule  Commonly known as: Colace           ferrous sulfate (325 mg ferrous sulfate) tablet           levothyroxine 200 mcg  tablet  Commonly known as: Synthroid      Take 1 tablet (200 mcg) by mouth once daily in the morning. Take before meals. Take with 75 mcg dose       levothyroxine 75 mcg tablet  Commonly known as: Synthroid      Take 1 tablet (75 mcg) by mouth once daily in the morning. Take before meals. Take w/ 200 mcg dose       magnesium oxide 400 mg tablet  Commonly known as: Mag-Ox           nitroglycerin 0.4 mg SL tablet  Commonly known as: Nitrostat      Place 1 tablet (0.4 mg) under the tongue every 5 minutes if needed for chest pain.       PRESERVISION AREDS 2 PLUS MV ORAL           ramipril 5 mg capsule  Commonly known as: Altace      Take 1 capsule (5 mg) by mouth once daily.       rosuvastatin 40 mg tablet  Commonly known as: Crestor      Take 1 tablet (40 mg) by mouth once daily.       sodium fluoride-pot nitrate 1.1-5 % paste                     Where to Get Your Medications        These medications were sent to [id number] - [place], [place] - [address] [address] [address]      Phone: [phone]   clopidogrel 75 mg tablet         Test Results Pending At Discharge  Pending Labs       No current pending labs.            CDU COURSE:  This is 79 year old male who was admitted to the CDU for TIA work up.   His work up did not show any concerning findings at this time. He was given 300 mg of Plavix and Rx for Plavix 75 mg was sent to his pharmacy x 90 days.  He will also call Dr. Dupree on Monday morning and will discuss Zio patch.     Outpatient Follow-Up  Future Appointments   Date Time Provider Department Center   5/20/2025  8:30 AM Calixto Peters MD [id number] [facility]   8/26/2025  1:20 PM Leif Simons MD [id number] [facility]         Renata Patino, APRN-CNP, DNP

## 2024-12-16 LAB
ATRIAL RATE: 66 BPM
P AXIS: 35 DEGREES
P OFFSET: 181 MS
P ONSET: 116 MS
PR INTERVAL: 194 MS
Q ONSET: 213 MS
QRS COUNT: 11 BEATS
QRS DURATION: 102 MS
QT INTERVAL: 432 MS
QTC CALCULATION(BAZETT): 452 MS
QTC FREDERICIA: 446 MS
R AXIS: 47 DEGREES
T AXIS: -7 DEGREES
T OFFSET: 429 MS
VENTRICULAR RATE: 66 BPM

## 2024-12-17 ENCOUNTER — OFFICE VISIT (OUTPATIENT)
Dept: CARDIOLOGY | Facility: CLINIC | Age: 79
End: 2024-12-17
Payer: MEDICARE

## 2024-12-17 ENCOUNTER — HOSPITAL ENCOUNTER (OUTPATIENT)
Dept: CARDIOLOGY | Facility: CLINIC | Age: 79
Discharge: HOME | End: 2024-12-17
Payer: MEDICARE

## 2024-12-17 VITALS
HEIGHT: 69 IN | WEIGHT: 214.06 LBS | DIASTOLIC BLOOD PRESSURE: 65 MMHG | HEART RATE: 55 BPM | BODY MASS INDEX: 31.71 KG/M2 | OXYGEN SATURATION: 96 % | SYSTOLIC BLOOD PRESSURE: 105 MMHG

## 2024-12-17 DIAGNOSIS — G45.9 TIA (TRANSIENT ISCHEMIC ATTACK): Primary | ICD-10-CM

## 2024-12-17 DIAGNOSIS — G45.9 TIA (TRANSIENT ISCHEMIC ATTACK): ICD-10-CM

## 2024-12-17 LAB — BODY SURFACE AREA: 2.17 M2

## 2024-12-17 PROCEDURE — 99214 OFFICE O/P EST MOD 30 MIN: CPT | Performed by: INTERNAL MEDICINE

## 2024-12-17 PROCEDURE — 1159F MED LIST DOCD IN RCRD: CPT | Performed by: INTERNAL MEDICINE

## 2024-12-17 PROCEDURE — 3074F SYST BP LT 130 MM HG: CPT | Performed by: INTERNAL MEDICINE

## 2024-12-17 PROCEDURE — 1126F AMNT PAIN NOTED NONE PRSNT: CPT | Performed by: INTERNAL MEDICINE

## 2024-12-17 PROCEDURE — 3078F DIAST BP <80 MM HG: CPT | Performed by: INTERNAL MEDICINE

## 2024-12-17 PROCEDURE — 1036F TOBACCO NON-USER: CPT | Performed by: INTERNAL MEDICINE

## 2024-12-17 PROCEDURE — 93246 EXT ECG>7D<15D RECORDING: CPT

## 2024-12-17 ASSESSMENT — ENCOUNTER SYMPTOMS
HEADACHES: 0
ALTERED MENTAL STATUS: 0
FEVER: 0
DYSURIA: 0
DIARRHEA: 0
BLOATING: 0
NAUSEA: 0
VOMITING: 0
COUGH: 0
CHILLS: 0
MEMORY LOSS: 0
MYALGIAS: 0
ABDOMINAL PAIN: 0
DEPRESSION: 0
OCCASIONAL FEELINGS OF UNSTEADINESS: 0
WHEEZING: 0
HEMATURIA: 0
FALLS: 0
CONSTIPATION: 0
HEMOPTYSIS: 0
LOSS OF SENSATION IN FEET: 0

## 2024-12-17 ASSESSMENT — COLUMBIA-SUICIDE SEVERITY RATING SCALE - C-SSRS
6. HAVE YOU EVER DONE ANYTHING, STARTED TO DO ANYTHING, OR PREPARED TO DO ANYTHING TO END YOUR LIFE?: NO
2. HAVE YOU ACTUALLY HAD ANY THOUGHTS OF KILLING YOURSELF?: NO
1. IN THE PAST MONTH, HAVE YOU WISHED YOU WERE DEAD OR WISHED YOU COULD GO TO SLEEP AND NOT WAKE UP?: NO

## 2024-12-17 ASSESSMENT — PAIN SCALES - GENERAL: PAINLEVEL_OUTOF10: 0-NO PAIN

## 2024-12-17 NOTE — PROGRESS NOTES
Chief Complaint   Patient presents with    Follow-up    Coronary Artery Disease    Hyperlipidemia    Hypertension       HPI  78 yo WM w/ h/o CAD s/p CABG  (LIMA->LAD, SVG->PDA), s/p MI , HTN, HLD, TG, hypothyroid, MOHIT (CPAP) now here for cardiology f/u.  hosp for exp aphasia x couple hours -> likely TIA. Plavix added. No further episodes.  No chest pain. No dyspnea. No palps. No LH/dizziness/syncope. No edema. No claudication. No further bedtime cramps with Tonic water. No cough. +occ back pain.  He golfs with no cardiac symptoms.  ECG : SB (59),  IMI, QTc 431  ECG : SB (57), ?inflat MI, QTc 447  ECG : SR (66), ?LMI  Echo : EF 55-60%, nl LA, no PFO  GONZALEZ : no ischemia, EF 55% -> 70%  GONZALEZ : no ischemia, EF 60% -> 70-75%  CTA chest 10/18: no PE, stable pulm nodules, sev cor calc, mild athero of Ao, no aneurysm  WOLF : normal  MRI brain : no acute abnl  Carotid US : no HDSS   CTA head/neck : no sig stenosis    Review of Systems   Constitutional: Negative for chills, fever and malaise/fatigue.   HENT:  Negative for hearing loss.    Eyes:  Negative for visual disturbance.   Respiratory:  Negative for cough, hemoptysis and wheezing.    Skin:  Negative for rash.   Musculoskeletal:  Negative for falls and myalgias.   Gastrointestinal:  Negative for bloating, abdominal pain, constipation, diarrhea, dysphagia, nausea and vomiting.   Genitourinary:  Negative for dysuria and hematuria.   Neurological:  Negative for headaches.   Psychiatric/Behavioral:  Negative for altered mental status, depression and memory loss.       Social History     Tobacco Use    Smoking status: Former     Current packs/day: 0.00     Average packs/day: 0.3 packs/day for 5.0 years (1.3 ttl pk-yrs)     Types: Cigarettes     Start date: 1966     Quit date: 1971     Years since quittin.9     Passive exposure: Past    Smokeless tobacco: Never   Substance Use Topics    Alcohol use: Not  Currently     Comment: social      Family History   Problem Relation Name Age of Onset    Coronary artery disease Sister        Allergies   Allergen Reactions    Diphenhydramine Hallucinations and Other     Hallucinations      Current Outpatient Medications   Medication Instructions    aspirin 81 mg, Daily    atenolol (TENORMIN) 12.5 mg, oral, Daily    cholecalciferol (Vitamin D-3) 5,000 Units tablet 1 tablet, Daily    clopidogrel (PLAVIX) 75 mg, oral, Daily    co-enzyme Q-10 30 mg, Daily    diclofenac (VOLTAREN) 75 mg, oral, 2 times daily    docusate sodium (Colace) 100 mg capsule 1 capsule, Daily    ferrous sulfate (325 mg ferrous sulfate) 325 mg, Daily with breakfast    levothyroxine (SYNTHROID) 200 mcg, oral, Daily before breakfast, Take with 75 mcg dose    levothyroxine (SYNTHROID) 75 mcg, oral, Daily before breakfast, Take w/ 200 mcg dose    magnesium oxide (MAG-OX) 400 mg, Daily    mv-min/FA/vit K/lutein/zeaxant (PRESERVISION AREDS 2 PLUS MV ORAL) 2 tablets, Daily    nitroglycerin (NITROSTAT) 0.4 mg, sublingual, Every 5 min PRN    ramipril (ALTACE) 5 mg, oral, Daily    rosuvastatin (CRESTOR) 40 mg, oral, Daily    sodium fluoride-pot nitrate 1.1-5 % paste USE AS DIRECTED      Vitals:    12/17/24 0934   BP: 105/65   Pulse: 55   SpO2: 96%      Physical Exam  Constitutional:       Appearance: Normal appearance.   HENT:      Head: Normocephalic and atraumatic.      Nose: Nose normal.   Neck:      Vascular: No carotid bruit.   Cardiovascular:      Rate and Rhythm: Normal rate and regular rhythm.      Heart sounds: No murmur heard.  Pulmonary:      Effort: Pulmonary effort is normal.      Breath sounds: Normal breath sounds.   Abdominal:      Palpations: Abdomen is soft.      Tenderness: There is no abdominal tenderness.   Musculoskeletal:      Right lower leg: Edema present.      Left lower leg: Edema present.      Comments: Tr LE edema   Skin:     General: Skin is warm and dry.   Neurological:      General: No  focal deficit present.      Mental Status: He is alert.   Psychiatric:         Mood and Affect: Mood normal.         Judgment: Judgment normal.        Results/Data  12/24 Cr 0.79, K 4.2, LFT nl, LDL 24, HDL 23, , Chol 93, HGB 13.4, , hgba1c 5.2, HSTPN 3  1/24 Cr 0.92, K 4.2, LFT nl, LDL 20, HDL 27, , Chol 102, HGB 13.1, , hgba1c 5.6, CRP 0.8  9/21 Cr 0.86, K 4.7, Mg 2.37, LDL 46, HDL 29, , Chol 116, Lp(a) <8.4, Homo 10  7/21 Cr 0.91, K 4.7, LFT nl, TSH 0.09, FT4 1.65  9/20 Cr 0.81, K 4.5, LFT nl, LDL 40, HDL 27, , Chol 128, HGB 13.8, , TSH 0.28, FT4 1.63  7/20 LDL 25, HDL 28, , Chol 115  9/19 HDL 34, , Chol 88, TSH 3.79, FT4 1.31  6/19 Cr 0.95, K 4.8, LFT nl, LDL 42, HDL 28, , Chol 146, HGB 14.3,   10/18 LDL 43, HDL 31, , Chol 129  10/17 LDL 49    Assessment/Plan   78 yo WM w/ h/o CAD s/p CABG '91 (LIMA->LAD, SVG->PDA), s/p MI '91, HTN, HLD, TG, hypothyroid, MOHIT (CPAP) now s/p likely TIA of unclear etiology. Workup negative. Monitor will be put on today to eval for AFIB (of note, LA is normal size on echo).  No cardiac symptoms.   -continue ASA 81 qd (with food) - duration per neuro (would hold off stopping until at least get monitor results back)  -continue Plavix 75 qd  -continue Atenolol 12.5 qd  -continue Ramipril 5 qd  -continue Rosuva 40 qhs -> goal LDL <55  -continue Omega-3 (Vascepa was expensive); handout on lifestyle/diet ways to reduce TGs given to pt -> goal TG <150   -f/u 1 year (earlier if needed)     Leif Simons MD

## 2025-01-13 DIAGNOSIS — E03.9 ADULT HYPOTHYROIDISM: ICD-10-CM

## 2025-01-13 DIAGNOSIS — I25.118 CORONARY ARTERY DISEASE INVOLVING NATIVE HEART WITH OTHER FORM OF ANGINA PECTORIS, UNSPECIFIED VESSEL OR LESION TYPE: ICD-10-CM

## 2025-01-13 DIAGNOSIS — G89.29 CHRONIC BACK PAIN, UNSPECIFIED BACK LOCATION, UNSPECIFIED BACK PAIN LATERALITY: ICD-10-CM

## 2025-01-13 DIAGNOSIS — M54.9 CHRONIC BACK PAIN, UNSPECIFIED BACK LOCATION, UNSPECIFIED BACK PAIN LATERALITY: ICD-10-CM

## 2025-01-13 DIAGNOSIS — I10 HYPERTENSION, UNSPECIFIED TYPE: ICD-10-CM

## 2025-01-13 LAB — BODY SURFACE AREA: 2.17 M2

## 2025-01-13 RX ORDER — LEVOTHYROXINE SODIUM 200 UG/1
TABLET ORAL
Qty: 90 TABLET | Refills: 3 | Status: SHIPPED | OUTPATIENT
Start: 2025-01-13

## 2025-01-13 RX ORDER — RAMIPRIL 5 MG/1
5 CAPSULE ORAL DAILY
Qty: 90 CAPSULE | Refills: 3 | Status: SHIPPED | OUTPATIENT
Start: 2025-01-13

## 2025-01-13 RX ORDER — LEVOTHYROXINE SODIUM 75 UG/1
TABLET ORAL
Qty: 90 TABLET | Refills: 3 | Status: SHIPPED | OUTPATIENT
Start: 2025-01-13

## 2025-01-13 RX ORDER — DICLOFENAC SODIUM 75 MG/1
75 TABLET, DELAYED RELEASE ORAL 2 TIMES DAILY
Qty: 180 TABLET | Refills: 3 | Status: SHIPPED | OUTPATIENT
Start: 2025-01-13

## 2025-01-13 RX ORDER — ROSUVASTATIN CALCIUM 40 MG/1
40 TABLET, COATED ORAL DAILY
Qty: 90 TABLET | Refills: 3 | Status: SHIPPED | OUTPATIENT
Start: 2025-01-13

## 2025-01-14 LAB
ATRIAL RATE: 61 BPM
P AXIS: 17 DEGREES
P OFFSET: 190 MS
P ONSET: 137 MS
PR INTERVAL: 152 MS
Q ONSET: 213 MS
QRS COUNT: 10 BEATS
QRS DURATION: 102 MS
QT INTERVAL: 424 MS
QTC CALCULATION(BAZETT): 426 MS
QTC FREDERICIA: 426 MS
R AXIS: 17 DEGREES
T AXIS: -6 DEGREES
T OFFSET: 425 MS
VENTRICULAR RATE: 61 BPM

## 2025-01-23 ENCOUNTER — TELEPHONE (OUTPATIENT)
Dept: PRIMARY CARE | Facility: CLINIC | Age: 80
End: 2025-01-23
Payer: MEDICARE

## 2025-01-23 DIAGNOSIS — G45.9 TIA (TRANSIENT ISCHEMIC ATTACK): ICD-10-CM

## 2025-01-23 RX ORDER — CLOPIDOGREL BISULFATE 75 MG/1
75 TABLET ORAL DAILY
Qty: 90 TABLET | Refills: 0 | Status: SHIPPED | OUTPATIENT
Start: 2025-01-23 | End: 2025-04-23

## 2025-01-23 NOTE — TELEPHONE ENCOUNTER
Patient calling to see if he needs to follow up with Neurologist Dr. Calixto Peters, they were able to get him in sooner and he is scheduled for March but last you spoke it was possible it wasn't even needed.    Patient also wants 90 day refill of Plavix

## 2025-02-26 DIAGNOSIS — Z00.00 HEALTHCARE MAINTENANCE: ICD-10-CM

## 2025-02-26 DIAGNOSIS — E61.1 IRON DEFICIENCY: ICD-10-CM

## 2025-02-26 DIAGNOSIS — D64.9 ANEMIA, UNSPECIFIED TYPE: ICD-10-CM

## 2025-03-01 PROCEDURE — 84443 ASSAY THYROID STIM HORMONE: CPT

## 2025-03-01 PROCEDURE — 84153 ASSAY OF PSA TOTAL: CPT

## 2025-03-01 PROCEDURE — 82306 VITAMIN D 25 HYDROXY: CPT

## 2025-03-01 PROCEDURE — 86141 C-REACTIVE PROTEIN HS: CPT

## 2025-03-01 PROCEDURE — 81003 URINALYSIS AUTO W/O SCOPE: CPT

## 2025-03-01 PROCEDURE — 84439 ASSAY OF FREE THYROXINE: CPT

## 2025-03-01 PROCEDURE — 80053 COMPREHEN METABOLIC PANEL: CPT

## 2025-03-01 PROCEDURE — 85025 COMPLETE CBC W/AUTO DIFF WBC: CPT

## 2025-03-01 PROCEDURE — 83550 IRON BINDING TEST: CPT

## 2025-03-01 PROCEDURE — 80061 LIPID PANEL: CPT

## 2025-03-01 PROCEDURE — 83540 ASSAY OF IRON: CPT

## 2025-03-01 PROCEDURE — 84207 ASSAY OF VITAMIN B-6: CPT

## 2025-03-01 PROCEDURE — 83036 HEMOGLOBIN GLYCOSYLATED A1C: CPT

## 2025-03-02 ENCOUNTER — LAB (OUTPATIENT)
Dept: LAB | Facility: HOSPITAL | Age: 80
End: 2025-03-02
Payer: MEDICARE

## 2025-03-02 DIAGNOSIS — D64.9 ANEMIA, UNSPECIFIED: Primary | ICD-10-CM

## 2025-03-02 DIAGNOSIS — Z00.00 ENCOUNTER FOR GENERAL ADULT MEDICAL EXAMINATION WITHOUT ABNORMAL FINDINGS: ICD-10-CM

## 2025-03-02 DIAGNOSIS — E61.1 IRON DEFICIENCY: ICD-10-CM

## 2025-03-02 LAB
25(OH)D3 SERPL-MCNC: 52 NG/ML (ref 30–100)
ALBUMIN SERPL BCP-MCNC: 4.7 G/DL (ref 3.4–5)
ALP SERPL-CCNC: 70 U/L (ref 33–136)
ALT SERPL W P-5'-P-CCNC: 27 U/L (ref 10–52)
ANION GAP SERPL CALC-SCNC: 17 MMOL/L (ref 10–20)
APPEARANCE UR: CLEAR
AST SERPL W P-5'-P-CCNC: 25 U/L (ref 9–39)
BASOPHILS # BLD AUTO: 0.02 X10*3/UL (ref 0–0.1)
BASOPHILS NFR BLD AUTO: 0.4 %
BILIRUB SERPL-MCNC: 0.7 MG/DL (ref 0–1.2)
BILIRUB UR STRIP.AUTO-MCNC: NEGATIVE MG/DL
BUN SERPL-MCNC: 23 MG/DL (ref 6–23)
CALCIUM SERPL-MCNC: 9.5 MG/DL (ref 8.6–10.6)
CHLORIDE SERPL-SCNC: 104 MMOL/L (ref 98–107)
CHOLEST SERPL-MCNC: 115 MG/DL (ref 0–199)
CHOLESTEROL/HDL RATIO: 3.8
CO2 SERPL-SCNC: 24 MMOL/L (ref 21–32)
COLOR UR: NORMAL
CREAT SERPL-MCNC: 0.95 MG/DL (ref 0.5–1.3)
CRP SERPL HS-MCNC: 0.4 MG/L
EGFRCR SERPLBLD CKD-EPI 2021: 82 ML/MIN/1.73M*2
EOSINOPHIL # BLD AUTO: 0.19 X10*3/UL (ref 0–0.4)
EOSINOPHIL NFR BLD AUTO: 3.9 %
ERYTHROCYTE [DISTWIDTH] IN BLOOD BY AUTOMATED COUNT: 13.9 % (ref 11.5–14.5)
EST. AVERAGE GLUCOSE BLD GHB EST-MCNC: 111 MG/DL
GLUCOSE SERPL-MCNC: 105 MG/DL (ref 74–99)
GLUCOSE UR STRIP.AUTO-MCNC: NORMAL MG/DL
HBA1C MFR BLD: 5.5 %
HCT VFR BLD AUTO: 44.8 % (ref 41–52)
HDLC SERPL-MCNC: 30 MG/DL
HGB BLD-MCNC: 14.2 G/DL (ref 13.5–17.5)
HOLD SPECIMEN: NORMAL
HOLD SPECIMEN: NORMAL
IMM GRANULOCYTES # BLD AUTO: 0.01 X10*3/UL (ref 0–0.5)
IMM GRANULOCYTES NFR BLD AUTO: 0.2 % (ref 0–0.9)
IRON SATN MFR SERPL: 27 % (ref 25–45)
IRON SERPL-MCNC: 101 UG/DL (ref 35–150)
KETONES UR STRIP.AUTO-MCNC: NEGATIVE MG/DL
LDLC SERPL CALC-MCNC: 27 MG/DL
LEUKOCYTE ESTERASE UR QL STRIP.AUTO: NEGATIVE
LYMPHOCYTES # BLD AUTO: 0.84 X10*3/UL (ref 0.8–3)
LYMPHOCYTES NFR BLD AUTO: 17.3 %
MCH RBC QN AUTO: 30.9 PG (ref 26–34)
MCHC RBC AUTO-ENTMCNC: 31.7 G/DL (ref 32–36)
MCV RBC AUTO: 97 FL (ref 80–100)
MONOCYTES # BLD AUTO: 0.38 X10*3/UL (ref 0.05–0.8)
MONOCYTES NFR BLD AUTO: 7.8 %
NEUTROPHILS # BLD AUTO: 3.42 X10*3/UL (ref 1.6–5.5)
NEUTROPHILS NFR BLD AUTO: 70.4 %
NITRITE UR QL STRIP.AUTO: NEGATIVE
NON HDL CHOLESTEROL: 85 MG/DL (ref 0–149)
NRBC BLD-RTO: 0 /100 WBCS (ref 0–0)
PH UR STRIP.AUTO: 6 [PH]
PLATELET # BLD AUTO: 149 X10*3/UL (ref 150–450)
POTASSIUM SERPL-SCNC: 4.7 MMOL/L (ref 3.5–5.3)
PROT SERPL-MCNC: 7.2 G/DL (ref 6.4–8.2)
PROT UR STRIP.AUTO-MCNC: NEGATIVE MG/DL
PSA SERPL-MCNC: 2.34 NG/ML
RBC # BLD AUTO: 4.6 X10*6/UL (ref 4.5–5.9)
RBC # UR STRIP.AUTO: NEGATIVE MG/DL
SODIUM SERPL-SCNC: 140 MMOL/L (ref 136–145)
SP GR UR STRIP.AUTO: 1.03
T4 FREE SERPL-MCNC: 1.69 NG/DL (ref 0.78–1.48)
TIBC SERPL-MCNC: 374 UG/DL (ref 240–445)
TRIGL SERPL-MCNC: 288 MG/DL (ref 0–149)
TSH SERPL-ACNC: 0.1 MIU/L (ref 0.44–3.98)
UIBC SERPL-MCNC: 273 UG/DL (ref 110–370)
UROBILINOGEN UR STRIP.AUTO-MCNC: NORMAL MG/DL
VLDL: 58 MG/DL (ref 0–40)
WBC # BLD AUTO: 4.9 X10*3/UL (ref 4.4–11.3)

## 2025-03-04 ENCOUNTER — TELEMEDICINE (OUTPATIENT)
Dept: NEUROLOGY | Facility: HOSPITAL | Age: 80
End: 2025-03-04
Payer: MEDICARE

## 2025-03-04 DIAGNOSIS — G45.9 TIA (TRANSIENT ISCHEMIC ATTACK): ICD-10-CM

## 2025-03-04 DIAGNOSIS — G45.9 TIA (TRANSIENT ISCHEMIC ATTACK): Primary | ICD-10-CM

## 2025-03-04 PROCEDURE — 99213 OFFICE O/P EST LOW 20 MIN: CPT | Performed by: PSYCHIATRY & NEUROLOGY

## 2025-03-05 ENCOUNTER — APPOINTMENT (OUTPATIENT)
Dept: PRIMARY CARE | Facility: CLINIC | Age: 80
End: 2025-03-05
Payer: MEDICARE

## 2025-03-05 ENCOUNTER — OFFICE VISIT (OUTPATIENT)
Dept: PRIMARY CARE | Facility: CLINIC | Age: 80
End: 2025-03-05
Payer: MEDICARE

## 2025-03-05 VITALS
TEMPERATURE: 97.8 F | OXYGEN SATURATION: 96 % | HEIGHT: 69 IN | DIASTOLIC BLOOD PRESSURE: 80 MMHG | WEIGHT: 216.8 LBS | BODY MASS INDEX: 32.11 KG/M2 | HEART RATE: 50 BPM | SYSTOLIC BLOOD PRESSURE: 122 MMHG

## 2025-03-05 VITALS
DIASTOLIC BLOOD PRESSURE: 80 MMHG | HEART RATE: 50 BPM | WEIGHT: 216.8 LBS | OXYGEN SATURATION: 96 % | TEMPERATURE: 97.8 F | HEIGHT: 69 IN | BODY MASS INDEX: 32.11 KG/M2 | SYSTOLIC BLOOD PRESSURE: 122 MMHG

## 2025-03-05 DIAGNOSIS — Z00.01 ENCOUNTER FOR GENERAL ADULT MEDICAL EXAMINATION WITH ABNORMAL FINDINGS: Primary | ICD-10-CM

## 2025-03-05 DIAGNOSIS — E61.1 IRON DEFICIENCY: ICD-10-CM

## 2025-03-05 DIAGNOSIS — G45.9 TIA (TRANSIENT ISCHEMIC ATTACK): ICD-10-CM

## 2025-03-05 DIAGNOSIS — G47.33 OBSTRUCTIVE SLEEP APNEA: ICD-10-CM

## 2025-03-05 DIAGNOSIS — E03.9 ADULT HYPOTHYROIDISM: ICD-10-CM

## 2025-03-05 DIAGNOSIS — E78.5 DYSLIPIDEMIA: ICD-10-CM

## 2025-03-05 DIAGNOSIS — Z00.00 MEDICARE ANNUAL WELLNESS VISIT, SUBSEQUENT: Primary | ICD-10-CM

## 2025-03-05 DIAGNOSIS — M41.9 SCOLIOSIS OF THORACOLUMBAR SPINE, UNSPECIFIED SCOLIOSIS TYPE: ICD-10-CM

## 2025-03-05 DIAGNOSIS — I10 HYPERTENSION, UNSPECIFIED TYPE: ICD-10-CM

## 2025-03-05 PROCEDURE — 3074F SYST BP LT 130 MM HG: CPT | Performed by: FAMILY MEDICINE

## 2025-03-05 PROCEDURE — 1126F AMNT PAIN NOTED NONE PRSNT: CPT | Performed by: FAMILY MEDICINE

## 2025-03-05 PROCEDURE — 3079F DIAST BP 80-89 MM HG: CPT | Performed by: FAMILY MEDICINE

## 2025-03-05 PROCEDURE — G0439 PPPS, SUBSEQ VISIT: HCPCS | Performed by: FAMILY MEDICINE

## 2025-03-05 RX ORDER — LEVOTHYROXINE SODIUM 75 UG/1
75 TABLET ORAL DAILY
Qty: 80 TABLET | Refills: 3 | Status: SHIPPED | OUTPATIENT
Start: 2025-03-05

## 2025-03-05 RX ORDER — FLUORIDE (SODIUM) 1.1 %
PASTE (ML) DENTAL
COMMUNITY
Start: 2025-02-21

## 2025-03-05 ASSESSMENT — LIFESTYLE VARIABLES
HOW OFTEN DO YOU HAVE SIX OR MORE DRINKS ON ONE OCCASION: NEVER
SKIP TO QUESTIONS 9-10: 1
AUDIT-C TOTAL SCORE: 1
HOW OFTEN DO YOU HAVE A DRINK CONTAINING ALCOHOL: MONTHLY OR LESS
HOW MANY STANDARD DRINKS CONTAINING ALCOHOL DO YOU HAVE ON A TYPICAL DAY: 1 OR 2

## 2025-03-05 ASSESSMENT — ENCOUNTER SYMPTOMS
LOSS OF SENSATION IN FEET: 0
OCCASIONAL FEELINGS OF UNSTEADINESS: 0
DEPRESSION: 0

## 2025-03-05 ASSESSMENT — PATIENT HEALTH QUESTIONNAIRE - PHQ9
SUM OF ALL RESPONSES TO PHQ9 QUESTIONS 1 & 2: 0
1. LITTLE INTEREST OR PLEASURE IN DOING THINGS: NOT AT ALL
2. FEELING DOWN, DEPRESSED OR HOPELESS: NOT AT ALL

## 2025-03-05 ASSESSMENT — PAIN SCALES - GENERAL
PAINLEVEL_OUTOF10: 0-NO PAIN
PAINLEVEL_OUTOF10: 0-NO PAIN

## 2025-03-05 NOTE — PROGRESS NOTES
Subjective   Patient ID: Baron Harrell is a 79 y.o. male who presents for Annual Exam ([facility]).  HPI  1) episode last December believed to be TIA -did have recent visit with neurologist who encouraged him to stay on Plavix - no further episodes - he has no concerns currently about his cognitive function - no ill-effects from the medication    2) hx of scoliosis - has been working with physical therapy and they recently recommended he put a lift in his left shoe which has made a significant improvement for him in terms of his back pain     3) CPAP - has been using for some time -recently needed to find a new durable medical equipment company - he is looking to connect with a provider who can help better monitor his downloadable data to see if adjustments should be made     4) history of iron deficiency -mild improvement in his iron and percent saturation levels -seems to be tolerating the supplement well    5) history of hypothyroidism  Recent test results show he is likely getting too much thyroid hormone but not really manifesting any symptoms such as racing heart, skipping beats, shakiness, diarrhea, excessive sweating, etc     6) Here to follow-up hypertension/lipids - denies chest pain, shortness of breath, dizziness, lightheadedness, hand or foot swelling that is new or different.  Taking and tolerating medications well.  Doing well with physical activity.    7) Prevention:  Colon cancer screening: Last colonoscopy in 2019 was recommended by Dr. Samaniego for no further screening as no history of adenomas  Prostate cancer screening: PSA 2.3, last year was 2.1, continue to follow annually  Skin cancer screening: Continue regular follow-up with dermatology  Immunizations: Due for Tdap in late April and Capvaxive when more available      Review of Systems  Essentially noncontributory otherwise    Objective   /80 (BP Location: Left arm, Patient Position: Sitting, BP Cuff Size: Large adult)   Pulse  "50   Temp 36.6 °C (97.8 °F) (Temporal)   Ht 1.74 m (5' 8.5\")   Wt 98.3 kg (216 lb 12.8 oz)   SpO2 96%   BMI 32.48 kg/m²     Physical Exam  Vitals and nursing note reviewed.   Constitutional:       General: He is not in acute distress.     Appearance: He is not ill-appearing.   HENT:      Head: Normocephalic and atraumatic.      Right Ear: Tympanic membrane normal.      Left Ear: Tympanic membrane normal.      Mouth/Throat:      Pharynx: No posterior oropharyngeal erythema.   Eyes:      General: No scleral icterus.     Extraocular Movements: Extraocular movements intact.      Pupils: Pupils are equal, round, and reactive to light.   Cardiovascular:      Rate and Rhythm: Normal rate and regular rhythm.      Heart sounds: No murmur heard.  Pulmonary:      Breath sounds: Normal breath sounds. No wheezing or rhonchi.   Abdominal:      General: Bowel sounds are normal. There is no distension.      Palpations: Abdomen is soft.      Tenderness: There is no abdominal tenderness.   Musculoskeletal:         General: Normal range of motion.      Cervical back: Normal range of motion.   Lymphadenopathy:      Cervical: No cervical adenopathy.   Skin:     General: Skin is warm and dry.   Neurological:      Mental Status: He is alert and oriented to person, place, and time. Mental status is at baseline.      Motor: No weakness.      Coordination: Coordination normal.      Deep Tendon Reflexes: Reflexes normal.   Psychiatric:         Mood and Affect: Mood normal.         Behavior: Behavior normal.         Thought Content: Thought content normal.         Judgment: Judgment normal.         Assessment/Plan   Problem List Items Addressed This Visit       Hypothyroid    Obstructive sleep apnea    Relevant Orders    Referral to ENT    TIA (transient ischemic attack)     Other Visit Diagnoses       Encounter for general adult medical examination with abnormal findings    -  Primary    Scoliosis of thoracolumbar spine, unspecified " scoliosis type        Iron deficiency        Relevant Orders    Iron and TIBC    Adult hypothyroidism        Relevant Medications    levothyroxine (Synthroid, Levoxyl) 75 mcg tablet    Other Relevant Orders    Tsh With Reflex To Free T4 If Abnormal        1) TIA - cpm    2) hx of scoliosis - cont with lift and activity    3) CPAP - refer to Dr Romero     4) history of iron deficiency - cont Fe++ and recheck in 6 weeks    5) history of hypothyroidism - adjust down on T4 supp - recheck in 6 weeks    6) Here to follow-up hypertension/lipids - stable / cpm     7) Prevention:  Prostate cancer screening: PSA 2.3, last year was 2.1, continue to follow annually  Skin cancer screening: Continue regular follow-up with dermatology  Immunizations: Due for Tdap in late April and Capvaxive when more available

## 2025-03-05 NOTE — PROGRESS NOTES
"Baron Harrell is a 79 y.o. year old here for a Medicare Annual Wellness Visit     Health Risk Assessment  In general, health is:  very good (I'd agree)      Concerns with balance:  working on balance (no falls of note recently)      Concerns with teeth or dentures:  none      Concerns with sexual function:  not noted     Felt anxious, stressed, angry, irritable, lonely, isolated, or had thoughts of hurting themself:  \"I am very jag to not be affected by that\"     Has little interest or pleasure in doing things:  playing golf, watching the [employer]     Bothered by feeling down, depressed, or hopeless:  as noted above      Needs help with grocery shopping, cooking, housework, bathing, grooming, dressing, eating, sitting or standing, walking, using the toilet, handling finances, taking medications, using the telephone, or driving:  no      Following safety precautions in the home environment and vehicle: removed throw rugs from floors, installed grab bars in the bathroom, handrails in stairwells, having adequate lighting, wearing seatbelt at all times?:  yes     Smokes cigarettes, vapes, or chew tobacco:  quit remotely 1 pk/wk x 6 yrs     Eats healthy foods including fruits, vegetables, whole grains, and fiber-rich foods:  \"probably eat too much\" - \"do pretty well (nutr qual) but could do better\"      Number of days per week engages in exercise:  2     Average alcohol consumption: 2-3 drinks/week         Current Providers  Specialists: I have reviewed specialist-related care of the patient in the medical record.  Derm ([facility])  DDS ([facility] (a ) Neymar (periodontist))  Eye ([facility] ([facility] ([facility]))  Cardio ([facility])  ENT ([facility])    Medical/Family history review  Reviewed and updated problem list, medical/surgical/family/social history, medications, and allergies.     Opioid use review  Patient use of opioids:  0           Depression screening  Depression Screening PHQ-2 " "Score    As above         Depression screening tool completed and reviewed. Based on score and interview, patient is not at risk for depression. Screening tool discussed with patient, and I recommended no further intervention at this time.     Cognitive screening  Mini Cog Score:  5/5     Cognitive screening reviewed and plan:  not indicated     Functional Observation  Was the patient's timed Up & Go test unsteady or >= 12 seconds?  no     Advance Care Planning  End of Life planning discussed, including patient's advanced directive wishes:  yes    Vision and Hearing assessment  Visual acuity (required for Welcome to Medicare):  UTD with eye docs  Hearing Evaluation:  no clear indication     ====================================================    /80 (BP Location: Left arm, Patient Position: Sitting, BP Cuff Size: Large adult)   Pulse 50   Temp 36.6 °C (97.8 °F) (Temporal)   Ht 1.74 m (5' 8.5\")   Wt 98.3 kg (216 lb 12.8 oz)   SpO2 96%   BMI 32.48 kg/m²     Chief Complaint   Patient presents with    Medicare Annual Wellness Visit Subsequent       HPI  1) episode last December believed to be TIA -did have recent visit with neurologist who encouraged him to stay on Plavix - no further episodes - he has no concerns currently about his cognitive function - no ill-effects from the medication    2) hx of scoliosis - has been working with physical therapy and they recently recommended he put a lift in his left shoe which has made a significant improvement for him in terms of his back pain     3) CPAP - has been using for some time -recently needed to find a new durable medical equipment company - he is looking to connect with a provider who can help better monitor his downloadable data to see if adjustments should be made     4) history of iron deficiency -mild improvement in his iron and percent saturation levels -seems to be tolerating the supplement well    5) history of hypothyroidism  Recent test results " "show he is likely getting too much thyroid hormone but not really manifesting any symptoms such as racing heart, skipping beats, shakiness, diarrhea, excessive sweating, etc     6) Here to follow-up hypertension/lipids - denies chest pain, shortness of breath, dizziness, lightheadedness, hand or foot swelling that is new or different.  Taking and tolerating medications well.  Doing well with physical activity.    7) Prevention:  Colon cancer screening: Last colonoscopy in 2019 was recommended by Dr. Samaniego for no further screening as no history of adenomas  Prostate cancer screening: PSA 2.3, last year was 2.1, continue to follow annually  Skin cancer screening: Continue regular follow-up with dermatology  Immunizations: Due for Tdap in late April and Capvaxive when more available      Review of Systems  Essentially noncontributory otherwise    Objective   /80 (BP Location: Left arm, Patient Position: Sitting, BP Cuff Size: Large adult)   Pulse 50   Temp 36.6 °C (97.8 °F) (Temporal)   Ht 1.74 m (5' 8.5\")   Wt 98.3 kg (216 lb 12.8 oz)   SpO2 96%   BMI 32.48 kg/m²     Physical Exam  Vitals and nursing note reviewed.   Constitutional:       General: He is not in acute distress.     Appearance: He is not ill-appearing.   HENT:      Head: Normocephalic and atraumatic.      Right Ear: Tympanic membrane normal.      Left Ear: Tympanic membrane normal.      Mouth/Throat:      Pharynx: No posterior oropharyngeal erythema.   Eyes:      General: No scleral icterus.     Extraocular Movements: Extraocular movements intact.      Pupils: Pupils are equal, round, and reactive to light.   Cardiovascular:      Rate and Rhythm: Normal rate and regular rhythm.      Heart sounds: No murmur heard.  Pulmonary:      Breath sounds: Normal breath sounds. No wheezing or rhonchi.   Abdominal:      General: Bowel sounds are normal. There is no distension.      Palpations: Abdomen is soft.      Tenderness: There is no abdominal " tenderness.   Musculoskeletal:         General: Normal range of motion.      Cervical back: Normal range of motion.   Lymphadenopathy:      Cervical: No cervical adenopathy.   Skin:     General: Skin is warm and dry.   Neurological:      Mental Status: He is alert and oriented to person, place, and time. Mental status is at baseline.      Motor: No weakness.      Coordination: Coordination normal.      Deep Tendon Reflexes: Reflexes normal.   Psychiatric:         Mood and Affect: Mood normal.         Behavior: Behavior normal.         Thought Content: Thought content normal.         Judgment: Judgment normal.         Assessment/Plan   Problem List Items Addressed This Visit       Hypothyroid    Obstructive sleep apnea    Relevant Orders    Referral to ENT    TIA (transient ischemic attack)     Other Visit Diagnoses       Encounter for general adult medical examination with abnormal findings    -  Primary    Scoliosis of thoracolumbar spine, unspecified scoliosis type        Iron deficiency        Relevant Orders    Iron and TIBC    Adult hypothyroidism        Relevant Medications    levothyroxine (Synthroid, Levoxyl) 75 mcg tablet    Other Relevant Orders    Tsh With Reflex To Free T4 If Abnormal        1) TIA - cpm    2) hx of scoliosis - cont with lift and activity    3) CPAP - refer to Dr Romero     4) history of iron deficiency - cont Fe++ and recheck in 6 weeks    5) history of hypothyroidism - adjust down on T4 supp - recheck in 6 weeks    6) Here to follow-up hypertension/lipids - stable / cpm     7) Prevention:  Prostate cancer screening: PSA 2.3, last year was 2.1, continue to follow annually  Skin cancer screening: Continue regular follow-up with dermatology  Immunizations: Due for Tdap in late April and Capvaxive when more available

## 2025-03-06 LAB — PYRIDOXAL PHOS SERPL-SCNC: 36.6 NMOL/L (ref 20–125)

## 2025-03-08 PROBLEM — E03.9 ADULT HYPOTHYROIDISM: Status: ACTIVE | Noted: 2025-03-08

## 2025-03-08 PROBLEM — M41.9 SCOLIOSIS OF THORACOLUMBAR SPINE: Status: ACTIVE | Noted: 2025-03-08

## 2025-03-08 PROBLEM — E61.1 IRON DEFICIENCY: Status: ACTIVE | Noted: 2025-03-08

## 2025-03-13 NOTE — PROGRESS NOTES
Vascular Neurology [facility]:    HPI: Baron Harrell is a 79 y.o. Right-handed male with history of CAD s/p CABG '91 (LIMA->LAD, SVG->PDA), s/p MI '91, HTN, HLD, TG, hypothyroid, MOHIT (CPAP), who presents for TIA followup    Patient was seen in December 2024 with transient aphasia that was thought to be a TIA. He was placed on DAPT for 21 days, then switching to Plavix monotherapy.    TIA workup:   MRI brain unremarkable   CTA showed no significant stenosis or occlusion   TTE: Normal EF, no PFO   Ziopatch: No A.fib   LDL 27, A1c 5.5%    Today, he is doing well. Discussed etiology being cerebral atherosclerosis given his strong vascular history and atherosclerosis seen on his CTA.     Completed 21 days of ASA and Plavix, now on Plavix.     ROS: All systems reviewed and negative other than mentioned above    PMH: No past medical history on file.     PSH: No past surgical history on file.     Allergies:   Allergies   Allergen Reactions    Diphenhydramine Other and Hallucinations     Hallucinations        FH:   Family History   Problem Relation Name Age of Onset    Coronary artery disease Sister            Objective:    There were no vitals taken for this visit.     [unfilled]       Current Outpatient Medications:     atenolol (Tenormin) 25 mg tablet, Take 0.5 tablets (12.5 mg) by mouth once daily., Disp: 45 tablet, Rfl: 3    cholecalciferol (Vitamin D-3) 5,000 Units tablet, Take 1 tablet (5,000 Units) by mouth once daily., Disp: , Rfl:     clopidogrel (Plavix) 75 mg tablet, Take 1 tablet (75 mg) by mouth once daily., Disp: 90 tablet, Rfl: 0    co-enzyme Q-10 30 mg capsule, Take 1 capsule (30 mg) by mouth once daily., Disp: , Rfl:     diclofenac (Voltaren) 75 mg EC tablet, TAKE 1 TABLET TWICE A DAY, Disp: 180 tablet, Rfl: 3    docusate sodium (Colace) 100 mg capsule, Take 1 capsule (100 mg) by mouth once daily., Disp: , Rfl:     ferrous sulfate, 325 mg ferrous sulfate, tablet, Take 1 tablet (325 mg) by mouth  once daily with breakfast., Disp: , Rfl:     levothyroxine (Synthroid, Levoxyl) 200 mcg tablet, TAKE 1 TABLET ONCE DAILY IN THE MORNING. TAKE BEFORE A MEAL. TAKE WITH 75 MCG DOSE., Disp: 90 tablet, Rfl: 3    levothyroxine (Synthroid, Levoxyl) 75 mcg tablet, Take 1 tablet (75 mcg) by mouth early in the morning.. BUT SKIP SUNDAY - Take on an empty stomach at the same time each day, either 30 to 60 minutes prior to breakfast, Disp: 80 tablet, Rfl: 3    magnesium oxide (Mag-Ox) 400 mg tablet, Take 1 tablet (400 mg) by mouth once daily., Disp: , Rfl:     mv-min/FA/vit K/lutein/zeaxant (PRESERVISION AREDS 2 PLUS MV ORAL), Take 2 tablets by mouth once daily., Disp: , Rfl:     nitroglycerin (Nitrostat) 0.4 mg SL tablet, Place 1 tablet (0.4 mg) under the tongue every 5 minutes if needed for chest pain., Disp: 25 tablet, Rfl: 1    PreviDent 5000 Booster Plus 1.1 % dental paste, BRUSH ON TEETH FOR 2 MINUTES EVERY DAY, Disp: , Rfl:     ramipril (Altace) 5 mg capsule, TAKE 1 CAPSULE DAILY, Disp: 90 capsule, Rfl: 3    rosuvastatin (Crestor) 40 mg tablet, TAKE 1 TABLET DAILY, Disp: 90 tablet, Rfl: 3                   Neurologic Exam:  Physical exam limited by nature of virtual visit     Mentals status: A&Ox4, able to follow simple commands, memory intact to events, has good insight into medical condition. No Aphasia or dysarthria     CRANIAL NERVES:  EOM intact in all directions with no nystagmus, symmetrical facial strength and sensation, tongue and palate with no deviation     MOTOR: All extremities antigravity with no drift     COORDINATION: Finger to nose intact bilaterally.     SENSORY: Intact and symmetric to light touch in BUEs and BLEs     Assessment/Plan:  79y old man with a TIA likely related to his intracranial atherosclerotic disease.     - Check Plavix resistance and platelets function tests, if within therapeutic ranges then continue Plavix.   - Follow up PRN.    Calixto Peters MD   [facility]  [facility]

## 2025-03-25 ENCOUNTER — APPOINTMENT (OUTPATIENT)
Dept: OTOLARYNGOLOGY | Facility: CLINIC | Age: 80
End: 2025-03-25
Payer: MEDICARE

## 2025-03-25 VITALS — TEMPERATURE: 97 F | HEIGHT: 69 IN | BODY MASS INDEX: 31.99 KG/M2 | WEIGHT: 216 LBS

## 2025-03-25 DIAGNOSIS — G47.33 OBSTRUCTIVE SLEEP APNEA: Primary | ICD-10-CM

## 2025-03-25 PROCEDURE — 99203 OFFICE O/P NEW LOW 30 MIN: CPT | Performed by: OTOLARYNGOLOGY

## 2025-03-25 PROCEDURE — 1159F MED LIST DOCD IN RCRD: CPT | Performed by: OTOLARYNGOLOGY

## 2025-03-25 PROCEDURE — 1036F TOBACCO NON-USER: CPT | Performed by: OTOLARYNGOLOGY

## 2025-03-25 PROCEDURE — 1160F RVW MEDS BY RX/DR IN RCRD: CPT | Performed by: OTOLARYNGOLOGY

## 2025-03-25 NOTE — PROGRESS NOTES
Subjective   Patient ID: Baron Harrell is a 79 y.o. male  HPI  Patient is complaining of a chronic history of obstructive sleep apnea.  He has been using CPAP for many years.  He has not had a sleep study in more than 5 years and he is concerned that his sleep apnea may have changed or worsened.  His weight has been stable at approximately 215 pounds.  He does not have any significant nasal congestion or throat symptoms.    Review of Systems    Objective   Physical Exam  The following elements of a brief ear nose and throat exam were performed: External ear canals and tympanic membranes, external nose and nasal passages, oral cavity, palpation of the neck, percussion of the face, palpation of the thyroid.    There is elevation of the BMI at 32.3.  The nasal cavity is clear anteriorly.  The oral cavity is clear and there is no oropharyngeal obstruction noted.  The remainder of his exam was within normal limits.  His medical records were reviewed and multiple CPAP compliance reports were reviewed from November 2024 and January 2023 and November 2022.    Assessment/Plan   Diagnoses and all orders for this visit:  Obstructive sleep apnea (Primary)  -     Home sleep apnea test (HSAT)     Chronic obstructive sleep apnea with no record of recent sleep study noted in the patient's chart.  The patient was scheduled for a new sleep study at this point to quantify the sleep apnea and he will follow-up with result.

## 2025-04-04 ENCOUNTER — PROCEDURE VISIT (OUTPATIENT)
Dept: SLEEP MEDICINE | Facility: HOSPITAL | Age: 80
End: 2025-04-04
Payer: MEDICARE

## 2025-04-04 DIAGNOSIS — G47.33 OBSTRUCTIVE SLEEP APNEA: ICD-10-CM

## 2025-04-04 PROCEDURE — 95806 SLEEP STUDY UNATT&RESP EFFT: CPT | Performed by: GENERAL PRACTICE

## 2025-04-13 DIAGNOSIS — G45.9 TIA (TRANSIENT ISCHEMIC ATTACK): ICD-10-CM

## 2025-04-18 ENCOUNTER — TELEPHONE (OUTPATIENT)
Dept: OTOLARYNGOLOGY | Facility: CLINIC | Age: 80
End: 2025-04-18
Payer: MEDICARE

## 2025-04-18 DIAGNOSIS — G45.9 TIA (TRANSIENT ISCHEMIC ATTACK): ICD-10-CM

## 2025-04-19 RX ORDER — CLOPIDOGREL BISULFATE 75 MG/1
75 TABLET ORAL DAILY
Qty: 90 TABLET | Refills: 0 | Status: SHIPPED | OUTPATIENT
Start: 2025-04-19

## 2025-04-24 ENCOUNTER — OFFICE VISIT (OUTPATIENT)
Dept: OTOLARYNGOLOGY | Facility: CLINIC | Age: 80
End: 2025-04-24
Payer: MEDICARE

## 2025-04-24 VITALS — WEIGHT: 216 LBS | HEIGHT: 69 IN | BODY MASS INDEX: 31.99 KG/M2

## 2025-04-24 DIAGNOSIS — G47.33 OBSTRUCTIVE SLEEP APNEA: Primary | ICD-10-CM

## 2025-04-24 PROCEDURE — 1160F RVW MEDS BY RX/DR IN RCRD: CPT | Performed by: OTOLARYNGOLOGY

## 2025-04-24 PROCEDURE — 99213 OFFICE O/P EST LOW 20 MIN: CPT | Performed by: OTOLARYNGOLOGY

## 2025-04-24 PROCEDURE — 1159F MED LIST DOCD IN RCRD: CPT | Performed by: OTOLARYNGOLOGY

## 2025-04-24 PROCEDURE — 1036F TOBACCO NON-USER: CPT | Performed by: OTOLARYNGOLOGY

## 2025-04-24 NOTE — PROGRESS NOTES
Subjective   Patient ID: Baron Harrell is a 79 y.o. male  HPI  Patient presents for follow-up for chronic obstructive sleep apnea.  He has been tolerating his CPAP machine at least 4 to 5 hours every night.    Review of Systems    Objective   Physical Exam  The nasal cavity is clear anteriorly.  The oral cavity is clear.  The sleep study reveals moderate obstructive sleep apnea with RDI 4% at 21 and RDI 3% of 12.    Assessment/Plan   Diagnoses and all orders for this visit:  Obstructive sleep apnea (Primary)  -     Positive Airway Pressure (PAP) Therapy     Chronic moderate obstructive sleep apnea confirmed by the most recent sleep study dated April 21, 2025 with RDI 4% at 21 and RDI 3% at 12.  He was advised to continue using the CPAP and CPAP supplies were ordered and he will follow-up in 1 year.  The total time spent with the patient was 22 minutes of which at least 50 minutes were spent discussing sleep apnea management recommendations and alternative treatments.  He was also advised to pursue weight loss in light of his elevated BMI.

## 2025-04-26 LAB
IRON SATN MFR SERPL: 34 % (CALC) (ref 20–48)
IRON SERPL-MCNC: 108 MCG/DL (ref 50–180)
T4 FREE SERPL-MCNC: 1.6 NG/DL (ref 0.8–1.8)
TIBC SERPL-MCNC: 319 MCG/DL (CALC) (ref 250–425)
TSH SERPL-ACNC: 0.06 MIU/L (ref 0.4–4.5)

## 2025-05-05 DIAGNOSIS — G45.9 TIA (TRANSIENT ISCHEMIC ATTACK): Primary | ICD-10-CM

## 2025-05-09 ENCOUNTER — TELEPHONE (OUTPATIENT)
Dept: PRIMARY CARE | Facility: CLINIC | Age: 80
End: 2025-05-09
Payer: MEDICARE

## 2025-05-09 DIAGNOSIS — E03.9 HYPOTHYROIDISM, UNSPECIFIED TYPE: Primary | ICD-10-CM

## 2025-05-09 DIAGNOSIS — E03.9 ADULT HYPOTHYROIDISM: ICD-10-CM

## 2025-05-09 RX ORDER — LEVOTHYROXINE SODIUM 200 UG/1
200 TABLET ORAL EVERY EVENING
Qty: 80 TABLET | Refills: 3 | Status: SHIPPED | OUTPATIENT
Start: 2025-05-09

## 2025-05-09 RX ORDER — LEVOTHYROXINE SODIUM 75 UG/1
75 TABLET ORAL EVERY EVENING
Qty: 80 TABLET | Refills: 3 | Status: SHIPPED | OUTPATIENT
Start: 2025-05-09

## 2025-05-09 RX ORDER — LEVOTHYROXINE SODIUM 75 UG/1
75 TABLET ORAL EVERY EVENING
Qty: 80 TABLET | Refills: 3 | Status: SHIPPED | OUTPATIENT
Start: 2025-05-09 | End: 2025-05-09

## 2025-05-09 NOTE — TELEPHONE ENCOUNTER
Called back and discussed  Noted that the platelet test is still in process apparently as nothing has been reported -also noted I really would not be the 1 to best interpret that test    Iron testing seems fine, can continue is doing    However his TSH has remained low and will need further reduction in his thyroid supplementation, did not note any significant hyperthyroid symptoms however, plan to skip both Friday and Saturday night dosing and plan on checking TSH in 4 to 6 weeks    Updated Rxs  Requested Prescriptions     Signed Prescriptions Disp Refills    levothyroxine (Synthroid, Levoxyl) 200 mcg tablet 80 tablet 3     Sig: Take 1 tablet (200 mcg) by mouth once daily in the evening. Take with 75 mcg tablet - BUT SKIP SATURDAY - Take on an empty stomach at the same time each day     Authorizing Provider: GE CHISHOLM    levothyroxine (Synthroid, Levoxyl) 75 mcg tablet 80 tablet 3     Sig: Take 1 tablet (75 mcg) by mouth once daily in the evening. Take with 200 mcg tablet - BUT SKIP SATURDAY - Take on an empty stomach at the same time each day     Authorizing Provider: GE CHISHOLM

## 2025-05-09 NOTE — TELEPHONE ENCOUNTER
Patient states that he had some labs done last week and would like to review results with you re: plavix and iron.  Please call to discuss before the weekend.

## 2025-05-12 DIAGNOSIS — G45.9 TIA (TRANSIENT ISCHEMIC ATTACK): ICD-10-CM

## 2025-05-13 RX ORDER — CLOPIDOGREL BISULFATE 75 MG/1
75 TABLET ORAL DAILY
Qty: 90 TABLET | Refills: 0 | Status: SHIPPED | OUTPATIENT
Start: 2025-05-13

## 2025-05-19 DIAGNOSIS — G45.9 TIA (TRANSIENT ISCHEMIC ATTACK): ICD-10-CM

## 2025-05-20 ENCOUNTER — APPOINTMENT (OUTPATIENT)
Dept: NEUROLOGY | Facility: HOSPITAL | Age: 80
End: 2025-05-20
Payer: MEDICARE

## 2025-05-21 ENCOUNTER — TELEPHONE (OUTPATIENT)
Dept: PRIMARY CARE | Facility: CLINIC | Age: 80
End: 2025-05-21
Payer: MEDICARE

## 2025-05-21 DIAGNOSIS — M54.50 ACUTE RIGHT-SIDED LOW BACK PAIN, UNSPECIFIED WHETHER SCIATICA PRESENT: ICD-10-CM

## 2025-05-21 NOTE — TELEPHONE ENCOUNTER
Patient injured his low back while playing golf a little while back and would like RX to continue PT.  Orders placed/email this date.

## 2025-05-28 ENCOUNTER — LAB (OUTPATIENT)
Dept: LAB | Facility: HOSPITAL | Age: 80
End: 2025-05-28
Payer: MEDICARE

## 2025-05-28 PROCEDURE — 85390 FIBRINOLYSINS SCREEN I&R: CPT

## 2025-05-28 PROCEDURE — 82397 CHEMILUMINESCENT ASSAY: CPT

## 2025-05-28 PROCEDURE — 85576 BLOOD PLATELET AGGREGATION: CPT

## 2025-06-02 ENCOUNTER — TELEPHONE (OUTPATIENT)
Dept: PRIMARY CARE | Facility: CLINIC | Age: 80
End: 2025-06-02
Payer: MEDICARE

## 2025-06-02 DIAGNOSIS — M54.50 ACUTE RIGHT-SIDED LOW BACK PAIN, UNSPECIFIED WHETHER SCIATICA PRESENT: Primary | ICD-10-CM

## 2025-06-02 NOTE — TELEPHONE ENCOUNTER
Patient had been trying to reach ortho re: back injections.  Found out that the provider he had been seeing left over 6 months ago and though, still has a voicemail, nobody checks it.   Was told that they no longer do these type of injections in ortho and was referred back to his PCP.  Would like to know if     1) Can you refer to a pain management specialist?  States that his wife had seen Mikey Lathamntire in the past and she liked him a lot - he would be happy to see him if you would be agreeable?    2) Can you please orders for lumbar spine MRI?    Please advise or call to discuss.

## 2025-06-04 RX ORDER — CYCLOBENZAPRINE HCL 5 MG
5-10 TABLET ORAL 3 TIMES DAILY PRN
Qty: 30 TABLET | Refills: 0 | Status: SHIPPED | OUTPATIENT
Start: 2025-06-04

## 2025-06-04 RX ORDER — METHYLPREDNISOLONE 4 MG/1
TABLET ORAL
Qty: 21 TABLET | Refills: 0 | Status: SHIPPED | OUTPATIENT
Start: 2025-06-04 | End: 2025-06-10

## 2025-06-04 NOTE — TELEPHONE ENCOUNTER
Called and discussed with patient  Was playing golf when he felt something in his back on a follow-through of his swing - area of his back where he had trouble before - has been working with a therapist and making some progress but had been suggested to him by someone to perhaps consider a steroid pack which she has used before and also possibility of a muscle relaxant - discussed both of these with patient     Requested Prescriptions     Signed Prescriptions Disp Refills    methylPREDNISolone (Medrol Dospak) 4 mg tablets 21 tablet 0     Sig: Take as directed on package.     Authorizing Provider: GE CHISHOLM    cyclobenzaprine (Flexeril) 5 mg tablet 30 tablet 0     Sig: Take 1-2 tablets (5-10 mg) by mouth 3 times a day as needed for muscle spasms.     Authorizing Provider: GE CHISHOLM

## 2025-06-06 ENCOUNTER — TELEPHONE (OUTPATIENT)
Dept: PRIMARY CARE | Facility: CLINIC | Age: 80
End: 2025-06-06
Payer: MEDICARE

## 2025-06-06 DIAGNOSIS — E03.9 HYPOTHYROIDISM, UNSPECIFIED TYPE: ICD-10-CM

## 2025-06-06 NOTE — TELEPHONE ENCOUNTER
Patient started on Flexeril and Prednisone yesterday.  Notes to have extreme dry mouth this morning and questions if this is a common side effect for these medications.  I did advise that this could be a side effect of either medication and to make sure he Is hydrating,  drinking plenty of water throughout the day.  He feels like the back of his tongue is getting in his way of his speaking and he is not able to articulate well.  Please call to discuss if you feel this is concerning.  Otherwise - he said a return call is not necessary of this is common or a known side effect.

## 2025-06-11 ENCOUNTER — TELEPHONE (OUTPATIENT)
Dept: PRIMARY CARE | Facility: CLINIC | Age: 80
End: 2025-06-11
Payer: MEDICARE

## 2025-06-11 DIAGNOSIS — M54.50 ACUTE RIGHT-SIDED LOW BACK PAIN, UNSPECIFIED WHETHER SCIATICA PRESENT: ICD-10-CM

## 2025-06-11 RX ORDER — METHYLPREDNISOLONE 4 MG/1
TABLET ORAL
Qty: 21 TABLET | Refills: 0 | Status: SHIPPED | OUTPATIENT
Start: 2025-06-11 | End: 2025-06-17

## 2025-06-11 NOTE — TELEPHONE ENCOUNTER
Patient coming to the end of his treatment of steroid/muscle relaxer for his back.  While he is feeling better, he does still have moments of significant pain. The further apart he takes the steroids, the more pain he has.   He questions if he should take another dose of steroids?  Have a refill of Flexeril?  Please advise.

## 2025-06-12 LAB — SCAN RESULT: NORMAL

## 2025-06-16 DIAGNOSIS — G45.9 TIA (TRANSIENT ISCHEMIC ATTACK): ICD-10-CM

## 2025-06-17 LAB — TSH SERPL-ACNC: 0.23 MIU/L (ref 0.4–4.5)

## 2025-06-24 ENCOUNTER — TELEPHONE (OUTPATIENT)
Dept: PRIMARY CARE | Facility: CLINIC | Age: 80
End: 2025-06-24
Payer: MEDICARE

## 2025-06-24 DIAGNOSIS — E03.9 ADULT HYPOTHYROIDISM: ICD-10-CM

## 2025-06-24 RX ORDER — LEVOTHYROXINE SODIUM 200 UG/1
200 TABLET ORAL EVERY EVENING
Qty: 80 TABLET | Refills: 3 | Status: SHIPPED | OUTPATIENT
Start: 2025-06-24

## 2025-06-24 NOTE — TELEPHONE ENCOUNTER
Needs to pare back on medication based on TSH still below lower end of normal -he suggested that he would simply stop the 75 mcg tablet and continue on with the 200 mcg tablet EVERY DAY  - that would be a reasonable plan to do - plan to check TSH in 6 to 12 weeks

## 2025-06-24 NOTE — TELEPHONE ENCOUNTER
Patient states that he had some labs drawn last week that have been resulted and some abnormal.  Surprised not to hear about results.   Would like to speak with you asap regarding abnormalities and next steps.

## 2025-07-12 RX ORDER — CLOPIDOGREL BISULFATE 75 MG/1
75 TABLET ORAL DAILY
Qty: 90 TABLET | Refills: 0 | Status: SHIPPED | OUTPATIENT
Start: 2025-07-12

## 2025-08-14 DIAGNOSIS — I10 PRIMARY HYPERTENSION: ICD-10-CM

## 2025-08-14 RX ORDER — ATENOLOL 25 MG/1
TABLET ORAL DAILY
Qty: 45 TABLET | Refills: 3 | Status: SHIPPED | OUTPATIENT
Start: 2025-08-14

## 2025-08-26 ENCOUNTER — OFFICE VISIT (OUTPATIENT)
Dept: CARDIOLOGY | Facility: CLINIC | Age: 80
End: 2025-08-26
Payer: MEDICARE

## 2025-08-26 VITALS
BODY MASS INDEX: 32.73 KG/M2 | DIASTOLIC BLOOD PRESSURE: 65 MMHG | SYSTOLIC BLOOD PRESSURE: 103 MMHG | HEART RATE: 58 BPM | WEIGHT: 221 LBS | OXYGEN SATURATION: 94 % | HEIGHT: 69 IN

## 2025-08-26 DIAGNOSIS — I25.10 CORONARY ARTERY DISEASE INVOLVING NATIVE HEART WITHOUT ANGINA PECTORIS, UNSPECIFIED VESSEL OR LESION TYPE: Primary | ICD-10-CM

## 2025-08-26 PROCEDURE — 1126F AMNT PAIN NOTED NONE PRSNT: CPT | Performed by: INTERNAL MEDICINE

## 2025-08-26 PROCEDURE — 3078F DIAST BP <80 MM HG: CPT | Performed by: INTERNAL MEDICINE

## 2025-08-26 PROCEDURE — 99212 OFFICE O/P EST SF 10 MIN: CPT

## 2025-08-26 PROCEDURE — 3074F SYST BP LT 130 MM HG: CPT | Performed by: INTERNAL MEDICINE

## 2025-08-26 PROCEDURE — 1159F MED LIST DOCD IN RCRD: CPT | Performed by: INTERNAL MEDICINE

## 2025-08-26 PROCEDURE — 99214 OFFICE O/P EST MOD 30 MIN: CPT | Performed by: INTERNAL MEDICINE

## 2025-08-26 RX ORDER — SEMAGLUTIDE 0.25 MG/.5ML
0.25 INJECTION, SOLUTION SUBCUTANEOUS WEEKLY
Qty: 6 ML | Refills: 3 | Status: SHIPPED | OUTPATIENT
Start: 2025-08-26 | End: 2026-08-26

## 2025-08-26 ASSESSMENT — PAIN SCALES - GENERAL: PAINLEVEL_OUTOF10: 0-NO PAIN

## 2025-08-26 ASSESSMENT — ENCOUNTER SYMPTOMS
VOMITING: 0
MEMORY LOSS: 0
WHEEZING: 0
NAUSEA: 0
CHILLS: 0
HEADACHES: 0
FALLS: 0
BLOATING: 0
LOSS OF SENSATION IN FEET: 0
OCCASIONAL FEELINGS OF UNSTEADINESS: 0
DIARRHEA: 0
ABDOMINAL PAIN: 0
CONSTIPATION: 0
HEMATURIA: 0
HEMOPTYSIS: 0
DEPRESSION: 0
COUGH: 0
MYALGIAS: 0
FEVER: 0
ALTERED MENTAL STATUS: 0
DYSURIA: 0

## 2025-09-04 DIAGNOSIS — I25.10 CORONARY ARTERY DISEASE INVOLVING NATIVE HEART WITHOUT ANGINA PECTORIS, UNSPECIFIED VESSEL OR LESION TYPE: ICD-10-CM

## 2025-09-04 RX ORDER — SEMAGLUTIDE 0.25 MG/.5ML
0.25 INJECTION, SOLUTION SUBCUTANEOUS WEEKLY
Qty: 6 ML | Refills: 3 | Status: SHIPPED | OUTPATIENT
Start: 2025-09-04 | End: 2026-09-04